# Patient Record
Sex: FEMALE | Race: WHITE | NOT HISPANIC OR LATINO | Employment: UNEMPLOYED | URBAN - METROPOLITAN AREA
[De-identification: names, ages, dates, MRNs, and addresses within clinical notes are randomized per-mention and may not be internally consistent; named-entity substitution may affect disease eponyms.]

---

## 2017-04-11 ENCOUNTER — ALLSCRIPTS OFFICE VISIT (OUTPATIENT)
Dept: OTHER | Facility: OTHER | Age: 27
End: 2017-04-11

## 2017-04-14 ENCOUNTER — ALLSCRIPTS OFFICE VISIT (OUTPATIENT)
Dept: OTHER | Facility: OTHER | Age: 27
End: 2017-04-14

## 2017-04-14 DIAGNOSIS — Z34.00 ENCOUNTER FOR SUPERVISION OF NORMAL FIRST PREGNANCY: ICD-10-CM

## 2017-04-21 ENCOUNTER — APPOINTMENT (OUTPATIENT)
Dept: LAB | Facility: CLINIC | Age: 27
End: 2017-04-21
Payer: COMMERCIAL

## 2017-04-21 ENCOUNTER — LAB REQUISITION (OUTPATIENT)
Dept: LAB | Facility: HOSPITAL | Age: 27
End: 2017-04-21
Payer: COMMERCIAL

## 2017-04-21 DIAGNOSIS — Z34.00 ENCOUNTER FOR SUPERVISION OF NORMAL FIRST PREGNANCY: ICD-10-CM

## 2017-04-21 LAB
BASOPHILS # BLD AUTO: 0.01 THOUSANDS/ΜL (ref 0–0.1)
BASOPHILS NFR BLD AUTO: 0 % (ref 0–1)
BILIRUB UR QL STRIP: NEGATIVE
CLARITY UR: CLEAR
COLOR UR: YELLOW
EOSINOPHIL # BLD AUTO: 0.14 THOUSAND/ΜL (ref 0–0.61)
EOSINOPHIL NFR BLD AUTO: 2 % (ref 0–6)
ERYTHROCYTE [DISTWIDTH] IN BLOOD BY AUTOMATED COUNT: 12.2 % (ref 11.6–15.1)
GLUCOSE UR STRIP-MCNC: NEGATIVE MG/DL
HCT VFR BLD AUTO: 37.9 % (ref 34.8–46.1)
HGB BLD-MCNC: 13 G/DL (ref 11.5–15.4)
HGB UR QL STRIP.AUTO: NEGATIVE
KETONES UR STRIP-MCNC: NEGATIVE MG/DL
LEUKOCYTE ESTERASE UR QL STRIP: NEGATIVE
LYMPHOCYTES # BLD AUTO: 1.75 THOUSANDS/ΜL (ref 0.6–4.47)
LYMPHOCYTES NFR BLD AUTO: 23 % (ref 14–44)
MCH RBC QN AUTO: 32.4 PG (ref 26.8–34.3)
MCHC RBC AUTO-ENTMCNC: 34.3 G/DL (ref 31.4–37.4)
MCV RBC AUTO: 95 FL (ref 82–98)
MONOCYTES # BLD AUTO: 0.51 THOUSAND/ΜL (ref 0.17–1.22)
MONOCYTES NFR BLD AUTO: 7 % (ref 4–12)
NEUTROPHILS # BLD AUTO: 5.29 THOUSANDS/ΜL (ref 1.85–7.62)
NEUTS SEG NFR BLD AUTO: 68 % (ref 43–75)
NITRITE UR QL STRIP: NEGATIVE
NRBC BLD AUTO-RTO: 0 /100 WBCS
PH UR STRIP.AUTO: 6 [PH] (ref 4.5–8)
PLATELET # BLD AUTO: 197 THOUSANDS/UL (ref 149–390)
PMV BLD AUTO: 10.6 FL (ref 8.9–12.7)
PROT UR STRIP-MCNC: NEGATIVE MG/DL
RBC # BLD AUTO: 4.01 MILLION/UL (ref 3.81–5.12)
RUBV IGG SERPL IA-ACNC: >175 IU/ML
SP GR UR STRIP.AUTO: 1.02 (ref 1–1.03)
UROBILINOGEN UR QL STRIP.AUTO: 0.2 E.U./DL
WBC # BLD AUTO: 7.72 THOUSAND/UL (ref 4.31–10.16)

## 2017-04-21 PROCEDURE — 36415 COLL VENOUS BLD VENIPUNCTURE: CPT

## 2017-04-21 PROCEDURE — 81003 URINALYSIS AUTO W/O SCOPE: CPT

## 2017-04-21 PROCEDURE — 86778 TOXOPLASMA ANTIBODY IGM: CPT

## 2017-04-21 PROCEDURE — 80081 OBSTETRIC PANEL INC HIV TSTG: CPT

## 2017-04-21 PROCEDURE — 87086 URINE CULTURE/COLONY COUNT: CPT

## 2017-04-21 PROCEDURE — 86777 TOXOPLASMA ANTIBODY: CPT

## 2017-04-22 LAB
ABO GROUP BLD: NORMAL
BLD GP AB SCN SERPL QL: NEGATIVE
CLINICAL COMMENT: NORMAL
HBV SURFACE AG SER QL: NORMAL
RH BLD: POSITIVE
SPECIMEN EXPIRATION DATE: NORMAL
T GONDII IGG SERPL IA-ACNC: <3 IU/ML (ref 0–7.1)
T GONDII IGM SER IA-ACNC: <3 AU/ML (ref 0–7.9)

## 2017-04-23 LAB
BACTERIA UR CULT: NORMAL
BACTERIA UR CULT: NORMAL
GRAM STN SPEC: NORMAL

## 2017-04-24 LAB
HIV 1+2 AB+HIV1 P24 AG SERPL QL IA: NORMAL
RPR SER QL: NORMAL

## 2017-05-02 ENCOUNTER — GENERIC CONVERSION - ENCOUNTER (OUTPATIENT)
Dept: OTHER | Facility: OTHER | Age: 27
End: 2017-05-02

## 2017-05-02 ENCOUNTER — LAB REQUISITION (OUTPATIENT)
Dept: LAB | Facility: HOSPITAL | Age: 27
End: 2017-05-02
Payer: COMMERCIAL

## 2017-05-02 DIAGNOSIS — Z34.00 ENCOUNTER FOR SUPERVISION OF NORMAL FIRST PREGNANCY: ICD-10-CM

## 2017-05-02 PROCEDURE — 88175 CYTOPATH C/V AUTO FLUID REDO: CPT | Performed by: OBSTETRICS & GYNECOLOGY

## 2017-05-02 PROCEDURE — 87591 N.GONORRHOEAE DNA AMP PROB: CPT | Performed by: OBSTETRICS & GYNECOLOGY

## 2017-05-02 PROCEDURE — 87491 CHLMYD TRACH DNA AMP PROBE: CPT | Performed by: OBSTETRICS & GYNECOLOGY

## 2017-05-02 PROCEDURE — 87624 HPV HI-RISK TYP POOLED RSLT: CPT | Performed by: OBSTETRICS & GYNECOLOGY

## 2017-05-05 ENCOUNTER — ALLSCRIPTS OFFICE VISIT (OUTPATIENT)
Dept: PERINATAL CARE | Facility: MEDICAL CENTER | Age: 27
End: 2017-05-05
Payer: COMMERCIAL

## 2017-05-05 ENCOUNTER — GENERIC CONVERSION - ENCOUNTER (OUTPATIENT)
Dept: OTHER | Facility: OTHER | Age: 27
End: 2017-05-05

## 2017-05-05 PROCEDURE — 76813 OB US NUCHAL MEAS 1 GEST: CPT | Performed by: OBSTETRICS & GYNECOLOGY

## 2017-05-08 LAB
CHLAMYDIA DNA CVX QL NAA+PROBE: NORMAL
N GONORRHOEA DNA GENITAL QL NAA+PROBE: NORMAL

## 2017-05-09 LAB — HPV RRNA GENITAL QL NAA+PROBE: NORMAL

## 2017-05-10 LAB
LAB AP GYN PRIMARY INTERPRETATION: NORMAL
LAB AP LMP: NORMAL
Lab: NORMAL

## 2017-05-11 ENCOUNTER — LAB CONVERSION - ENCOUNTER (OUTPATIENT)
Dept: OTHER | Facility: OTHER | Age: 27
End: 2017-05-11

## 2017-05-11 ENCOUNTER — GENERIC CONVERSION - ENCOUNTER (OUTPATIENT)
Dept: OTHER | Facility: OTHER | Age: 27
End: 2017-05-11

## 2017-05-11 LAB
AGE RISK DOWN SYNDROME (HISTORICAL): NORMAL
CALC'D GESTATIONAL AGE (HISTORICAL): 12.7
COLLECTION DATE (HISTORICAL): NORMAL
CROWN RUMP LENGTH (HISTORICAL): 66 MM
CROWN RUMP LENGTH (HISTORICAL): NORMAL MM
DATE OF BIRTH (HISTORICAL): NORMAL
DONOR AGE; EGG RETRIEVAL (HISTORICAL): NORMAL
DONOR EGG (HISTORICAL): NO
EDD DETERMINED BY (HISTORICAL): NORMAL
ESTIMATED DELIVERY DATE (EDD) (HISTORICAL): NORMAL
HCG MOM (HISTORICAL): 1.63
HCG QUANTITATIVE (HISTORICAL): 157.7 IU/ML
HX OF NEURAL TUBE DEFECTS (HISTORICAL): NO
IF TWINS (HISTORICAL): NORMAL
INSULIN DEP. DIABETIC (HISTORICAL): NO
INTERPRETATION (HISTORICAL): NORMAL
MATERNAL WEIGHT (HISTORICAL): 120 LBS
MSS DOWN SYNDROME RISK (HISTORICAL): NORMAL
MSS3 TRISOMY 18 RISK (HISTORICAL): NORMAL
NASAL BONE (HISTORICAL): NORMAL
NASAL BONE (HISTORICAL): PRESENT
NT MOM (HISTORICAL): 1.01
NTQR LOCATION ID (HISTORICAL): NORMAL
NTQR READING PHYS ID (HISTORICAL): NORMAL
NUCHAL TRANSLUCENCY (HISTORICAL): 1.5 MM
NUCHAL TRANSLUCENCY (HISTORICAL): NORMAL MM
NUMBER OF FETUSES (HISTORICAL): 1
PAPP-A (HISTORICAL): 2.26
PAPP-A (HISTORICAL): 2663.4 NG/ML
PREV PREGNANCY DOWN SYND (HISTORICAL): NO
RACE/ETHNIC ORIGIN (HISTORICAL): NORMAL
REFERRING PHYSICIAN (HISTORICAL): NORMAL
REFERRING PHYSICIAN NPI (HISTORICAL): NORMAL
REFERRING PHYSICIAN PHONE (HISTORICAL): NORMAL
REPEAT SPECIMEN (HISTORICAL): NO
ULTRASONOGRAPHER ID (HISTORICAL): NORMAL
ULTRASOUND DATE (HISTORICAL): NORMAL

## 2017-06-02 ENCOUNTER — GENERIC CONVERSION - ENCOUNTER (OUTPATIENT)
Dept: OTHER | Facility: OTHER | Age: 27
End: 2017-06-02

## 2017-06-07 ENCOUNTER — LAB CONVERSION - ENCOUNTER (OUTPATIENT)
Dept: OTHER | Facility: OTHER | Age: 27
End: 2017-06-07

## 2017-06-07 LAB
AFP (HISTORICAL): 1
AFP (HISTORICAL): 41.8 NG/ML
AGE RISK DOWN SYNDROME (HISTORICAL): NORMAL
CALC'D GESTATIONAL AGE (HISTORICAL): 16.7
COLLECTION DATE (HISTORICAL): NORMAL
CROWN RUMP LENGTH (HISTORICAL): 66 MM
DATE OF BIRTH (HISTORICAL): NORMAL
ESTIMATED DELIVERY DATE (EDD) (HISTORICAL): NORMAL
ESTRADIOL, FREE (HISTORICAL): 0.8 NG/ML
ESTRIOL MOM (HISTORICAL): 0.73
HCG MOM (HISTORICAL): 1.97
HCG QUANTITATIVE (HISTORICAL): 68.7 IU/ML
HX OF NEURAL TUBE DEFECTS (HISTORICAL): NO
INHIBIN A (HISTORICAL): 162 PG/ML
INHIBIN A MOM (HISTORICAL): 0.87
INSULIN DEP. DIABETIC (HISTORICAL): NO
INTERPRETATION (HISTORICAL): NORMAL
MATERNAL WEIGHT (HISTORICAL): 120 LBS
MSAFP RISK OPEN NTD (HISTORICAL): NORMAL
MSS DOWN SYNDROME RISK (HISTORICAL): NORMAL
MSS3 TRISOMY 18 RISK (HISTORICAL): NORMAL
NASAL BONE (HISTORICAL): NORMAL
NASAL BONE (HISTORICAL): PRESENT
NT MOM (HISTORICAL): 1.01
NUCHAL TRANSLUCENCY (HISTORICAL): 1.5 MM
NUMBER OF FETUSES (HISTORICAL): 1
PAPP-A (HISTORICAL): 2.26
PAPP-A (HISTORICAL): 2663.4 NG/ML
RACE/ETHNIC ORIGIN (HISTORICAL): NORMAL
REFERRING PHYSICIAN (HISTORICAL): NORMAL
REFERRING PHYSICIAN NPI (HISTORICAL): NORMAL
REFERRING PHYSICIAN PHONE (HISTORICAL): NORMAL
REPEAT SPECIMEN (HISTORICAL): NO
SPECIMEN: (HISTORICAL): NORMAL
ULTRASOUND DATE (HISTORICAL): NORMAL

## 2017-06-08 ENCOUNTER — GENERIC CONVERSION - ENCOUNTER (OUTPATIENT)
Dept: OTHER | Facility: OTHER | Age: 27
End: 2017-06-08

## 2017-06-30 ENCOUNTER — GENERIC CONVERSION - ENCOUNTER (OUTPATIENT)
Dept: OTHER | Facility: OTHER | Age: 27
End: 2017-06-30

## 2017-06-30 ENCOUNTER — ALLSCRIPTS OFFICE VISIT (OUTPATIENT)
Dept: PERINATAL CARE | Facility: MEDICAL CENTER | Age: 27
End: 2017-06-30
Payer: COMMERCIAL

## 2017-06-30 PROCEDURE — 76805 OB US >/= 14 WKS SNGL FETUS: CPT | Performed by: OBSTETRICS & GYNECOLOGY

## 2017-06-30 PROCEDURE — 76817 TRANSVAGINAL US OBSTETRIC: CPT | Performed by: OBSTETRICS & GYNECOLOGY

## 2017-07-28 ENCOUNTER — GENERIC CONVERSION - ENCOUNTER (OUTPATIENT)
Dept: OTHER | Facility: OTHER | Age: 27
End: 2017-07-28

## 2017-07-28 DIAGNOSIS — Z34.00 ENCOUNTER FOR SUPERVISION OF NORMAL FIRST PREGNANCY: ICD-10-CM

## 2017-07-28 DIAGNOSIS — R73.02 IMPAIRED GLUCOSE TOLERANCE: ICD-10-CM

## 2017-08-14 ENCOUNTER — LAB CONVERSION - ENCOUNTER (OUTPATIENT)
Dept: OTHER | Facility: OTHER | Age: 27
End: 2017-08-14

## 2017-08-14 LAB
DEPRECATED RDW RBC AUTO: 11.6 % (ref 11–15)
GLUCOSE 1 HR 50 GM GLUC CHALLENGE-PREG PTS (HISTORICAL): 139 MG/DL
HCT VFR BLD AUTO: 35.6 % (ref 35–45)
HGB BLD-MCNC: 11.9 G/DL (ref 11.7–15.5)
MCH RBC QN AUTO: 32.7 PG (ref 27–33)
MCHC RBC AUTO-ENTMCNC: 33.4 G/DL (ref 32–36)
MCV RBC AUTO: 97.8 FL (ref 80–100)
PLATELET # BLD AUTO: 192 THOUSAND/UL (ref 140–400)
PMV BLD AUTO: 10.2 FL (ref 7.5–12.5)
RBC # BLD AUTO: 3.64 MILLION/UL (ref 3.8–5.1)
RPR SCREEN (HISTORICAL): NORMAL
WBC # BLD AUTO: 9.2 THOUSAND/UL (ref 3.8–10.8)

## 2017-08-15 ENCOUNTER — GENERIC CONVERSION - ENCOUNTER (OUTPATIENT)
Dept: OTHER | Facility: OTHER | Age: 27
End: 2017-08-15

## 2017-08-19 ENCOUNTER — LAB CONVERSION - ENCOUNTER (OUTPATIENT)
Dept: OTHER | Facility: OTHER | Age: 27
End: 2017-08-19

## 2017-08-19 LAB
COMMENT (HISTORICAL): ABNORMAL
GLUCOSE 1 HOUR 100 GM GLUC CHALLENGE-PREG PTS (HISTORICAL): 207 MG/DL
GLUCOSE 3 HOUR 100 GM GLUC CHALLENGE-PREG PTS (HISTORICAL): 101 MG/DL
GLUCOSE FASTING (HISTORICAL): 80 MG/DL (ref 65–94)
GLUCOSE, 2 HR PP (HISTORICAL): 146 MG/DL

## 2017-08-23 ENCOUNTER — ALLSCRIPTS OFFICE VISIT (OUTPATIENT)
Dept: OTHER | Facility: OTHER | Age: 27
End: 2017-08-23

## 2017-08-23 ENCOUNTER — GENERIC CONVERSION - ENCOUNTER (OUTPATIENT)
Dept: OTHER | Facility: OTHER | Age: 27
End: 2017-08-23

## 2017-09-08 ENCOUNTER — GENERIC CONVERSION - ENCOUNTER (OUTPATIENT)
Dept: OTHER | Facility: OTHER | Age: 27
End: 2017-09-08

## 2017-09-22 ENCOUNTER — GENERIC CONVERSION - ENCOUNTER (OUTPATIENT)
Dept: OTHER | Facility: OTHER | Age: 27
End: 2017-09-22

## 2017-10-03 ENCOUNTER — GENERIC CONVERSION - ENCOUNTER (OUTPATIENT)
Dept: OTHER | Facility: OTHER | Age: 27
End: 2017-10-03

## 2017-10-24 ENCOUNTER — GENERIC CONVERSION - ENCOUNTER (OUTPATIENT)
Dept: OTHER | Facility: OTHER | Age: 27
End: 2017-10-24

## 2017-10-24 ENCOUNTER — LAB REQUISITION (OUTPATIENT)
Dept: LAB | Facility: HOSPITAL | Age: 27
End: 2017-10-24
Payer: COMMERCIAL

## 2017-10-24 DIAGNOSIS — Z34.00 ENCOUNTER FOR SUPERVISION OF NORMAL FIRST PREGNANCY: ICD-10-CM

## 2017-10-24 PROCEDURE — 87653 STREP B DNA AMP PROBE: CPT | Performed by: OBSTETRICS & GYNECOLOGY

## 2017-10-26 LAB — GP B STREP DNA SPEC QL NAA+PROBE: NORMAL

## 2017-11-01 ENCOUNTER — HOSPITAL ENCOUNTER (OUTPATIENT)
Facility: HOSPITAL | Age: 27
Discharge: HOME | End: 2017-11-01
Attending: OBSTETRICS & GYNECOLOGY | Admitting: OBSTETRICS & GYNECOLOGY
Payer: COMMERCIAL

## 2017-11-01 ENCOUNTER — GENERIC CONVERSION - ENCOUNTER (OUTPATIENT)
Dept: OTHER | Facility: OTHER | Age: 27
End: 2017-11-01

## 2017-11-01 VITALS
HEART RATE: 77 BPM | RESPIRATION RATE: 18 BRPM | TEMPERATURE: 98.2 F | DIASTOLIC BLOOD PRESSURE: 69 MMHG | SYSTOLIC BLOOD PRESSURE: 132 MMHG

## 2017-11-01 PROCEDURE — 99213 OFFICE O/P EST LOW 20 MIN: CPT

## 2017-11-01 PROCEDURE — G0463 HOSPITAL OUTPT CLINIC VISIT: HCPCS

## 2017-11-02 ENCOUNTER — HOSPITAL ENCOUNTER (INPATIENT)
Facility: HOSPITAL | Age: 27
LOS: 2 days | Discharge: HOME | End: 2017-11-04
Attending: OBSTETRICS & GYNECOLOGY | Admitting: OBSTETRICS & GYNECOLOGY
Payer: COMMERCIAL

## 2017-11-02 ENCOUNTER — ANESTHESIA (INPATIENT)
Dept: LABOR AND DELIVERY | Facility: HOSPITAL | Age: 27
End: 2017-11-02
Payer: COMMERCIAL

## 2017-11-02 ENCOUNTER — ANESTHESIA EVENT (INPATIENT)
Dept: LABOR AND DELIVERY | Facility: HOSPITAL | Age: 27
End: 2017-11-02
Payer: COMMERCIAL

## 2017-11-02 DIAGNOSIS — Z3A.38 38 WEEKS GESTATION OF PREGNANCY: Primary | ICD-10-CM

## 2017-11-02 DIAGNOSIS — E66.09 CLASS 1 OBESITY DUE TO EXCESS CALORIES IN ADULT: ICD-10-CM

## 2017-11-02 LAB
ABO GROUP BLD: NORMAL
BASE EXCESS BLDCOA CALC-SCNC: -2.2 MMOL/L (ref 3–11)
BASE EXCESS BLDCOV CALC-SCNC: -3.3 MMOL/L (ref 1–9)
BLD GP AB SCN SERPL QL: NEGATIVE
ERYTHROCYTE [DISTWIDTH] IN BLOOD BY AUTOMATED COUNT: 12.7 % (ref 11.6–15.1)
HCO3 BLDCOA-SCNC: 26.3 MMOL/L (ref 17.3–27.3)
HCO3 BLDCOV-SCNC: 20.9 MMOL/L (ref 12.2–28.6)
HCT VFR BLD AUTO: 37.5 % (ref 34.8–46.1)
HGB BLD-MCNC: 12.8 G/DL (ref 11.5–15.4)
MCH RBC QN AUTO: 31.3 PG (ref 26.8–34.3)
MCHC RBC AUTO-ENTMCNC: 34.1 G/DL (ref 31.4–37.4)
MCV RBC AUTO: 92 FL (ref 82–98)
O2 CT VFR BLDCOA CALC: 8.2 ML/DL
OXYHGB MFR BLDCOA: 32 %
OXYHGB MFR BLDCOV: 70.3 %
PCO2 BLDCOA: 58.7 MM[HG] (ref 30–60)
PCO2 BLDCOV: 35.7 MM HG (ref 27–43)
PH BLDCOA: 7.27 [PH] (ref 7.23–7.43)
PH BLDCOV: 7.38 [PH] (ref 7.19–7.49)
PLATELET # BLD AUTO: 182 THOUSANDS/UL (ref 149–390)
PMV BLD AUTO: 10.2 FL (ref 8.9–12.7)
PO2 BLDCOA: 18.2 MM HG (ref 5–25)
PO2 BLDCOV: 30.3 MM HG (ref 15–45)
RBC # BLD AUTO: 4.09 MILLION/UL (ref 3.81–5.12)
RH BLD: POSITIVE
RPR SER QL: NORMAL
SAO2 % BLDCOV: 18.8 ML/DL
SPECIMEN EXPIRATION DATE: NORMAL
WBC # BLD AUTO: 13.81 THOUSAND/UL (ref 4.31–10.16)

## 2017-11-02 PROCEDURE — 86850 RBC ANTIBODY SCREEN: CPT | Performed by: OBSTETRICS & GYNECOLOGY

## 2017-11-02 PROCEDURE — 85027 COMPLETE CBC AUTOMATED: CPT | Performed by: OBSTETRICS & GYNECOLOGY

## 2017-11-02 PROCEDURE — 86901 BLOOD TYPING SEROLOGIC RH(D): CPT | Performed by: OBSTETRICS & GYNECOLOGY

## 2017-11-02 PROCEDURE — 86592 SYPHILIS TEST NON-TREP QUAL: CPT | Performed by: OBSTETRICS & GYNECOLOGY

## 2017-11-02 PROCEDURE — 82805 BLOOD GASES W/O2 SATURATION: CPT | Performed by: OBSTETRICS & GYNECOLOGY

## 2017-11-02 PROCEDURE — 86900 BLOOD TYPING SEROLOGIC ABO: CPT | Performed by: OBSTETRICS & GYNECOLOGY

## 2017-11-02 PROCEDURE — 99214 OFFICE O/P EST MOD 30 MIN: CPT

## 2017-11-02 PROCEDURE — 0KQM0ZZ REPAIR PERINEUM MUSCLE, OPEN APPROACH: ICD-10-PCS | Performed by: OBSTETRICS & GYNECOLOGY

## 2017-11-02 PROCEDURE — 88307 TISSUE EXAM BY PATHOLOGIST: CPT | Performed by: OBSTETRICS & GYNECOLOGY

## 2017-11-02 RX ORDER — ONDANSETRON 2 MG/ML
4 INJECTION INTRAMUSCULAR; INTRAVENOUS EVERY 6 HOURS PRN
Status: DISCONTINUED | OUTPATIENT
Start: 2017-11-02 | End: 2017-11-02

## 2017-11-02 RX ORDER — LIDOCAINE HYDROCHLORIDE 10 MG/ML
INJECTION, SOLUTION EPIDURAL; INFILTRATION; INTRACAUDAL; PERINEURAL
Status: COMPLETED
Start: 2017-11-02 | End: 2017-11-02

## 2017-11-02 RX ORDER — DIPHENHYDRAMINE HCL 25 MG
25 TABLET ORAL EVERY 6 HOURS PRN
Status: DISCONTINUED | OUTPATIENT
Start: 2017-11-02 | End: 2017-11-05 | Stop reason: HOSPADM

## 2017-11-02 RX ORDER — OXYTOCIN/RINGER'S LACTATE 30/500 ML
PLASTIC BAG, INJECTION (ML) INTRAVENOUS
Status: COMPLETED
Start: 2017-11-02 | End: 2017-11-02

## 2017-11-02 RX ORDER — ONDANSETRON 2 MG/ML
4 INJECTION INTRAMUSCULAR; INTRAVENOUS EVERY 8 HOURS PRN
Status: DISCONTINUED | OUTPATIENT
Start: 2017-11-02 | End: 2017-11-05 | Stop reason: HOSPADM

## 2017-11-02 RX ORDER — ROPIVACAINE HYDROCHLORIDE 2 MG/ML
INJECTION, SOLUTION EPIDURAL; INFILTRATION; PERINEURAL AS NEEDED
Status: DISCONTINUED | OUTPATIENT
Start: 2017-11-02 | End: 2017-11-02 | Stop reason: SURG

## 2017-11-02 RX ORDER — DIAPER,BRIEF,INFANT-TODD,DISP
1 EACH MISCELLANEOUS DAILY PRN
Status: DISCONTINUED | OUTPATIENT
Start: 2017-11-02 | End: 2017-11-05 | Stop reason: HOSPADM

## 2017-11-02 RX ORDER — ACETAMINOPHEN 325 MG/1
650 TABLET ORAL EVERY 6 HOURS PRN
Status: DISCONTINUED | OUTPATIENT
Start: 2017-11-02 | End: 2017-11-05 | Stop reason: HOSPADM

## 2017-11-02 RX ORDER — OXYCODONE HYDROCHLORIDE AND ACETAMINOPHEN 5; 325 MG/1; MG/1
2 TABLET ORAL EVERY 4 HOURS PRN
Status: DISCONTINUED | OUTPATIENT
Start: 2017-11-02 | End: 2017-11-05 | Stop reason: HOSPADM

## 2017-11-02 RX ORDER — OXYCODONE HYDROCHLORIDE AND ACETAMINOPHEN 5; 325 MG/1; MG/1
1 TABLET ORAL EVERY 4 HOURS PRN
Status: DISCONTINUED | OUTPATIENT
Start: 2017-11-02 | End: 2017-11-05 | Stop reason: HOSPADM

## 2017-11-02 RX ORDER — DOCUSATE SODIUM 100 MG/1
100 CAPSULE, LIQUID FILLED ORAL 2 TIMES DAILY
Status: DISCONTINUED | OUTPATIENT
Start: 2017-11-02 | End: 2017-11-05 | Stop reason: HOSPADM

## 2017-11-02 RX ORDER — SODIUM CHLORIDE, SODIUM LACTATE, POTASSIUM CHLORIDE, CALCIUM CHLORIDE 600; 310; 30; 20 MG/100ML; MG/100ML; MG/100ML; MG/100ML
125 INJECTION, SOLUTION INTRAVENOUS CONTINUOUS
Status: DISCONTINUED | OUTPATIENT
Start: 2017-11-02 | End: 2017-11-02

## 2017-11-02 RX ORDER — ACETAMINOPHEN 325 MG/1
650 TABLET ORAL EVERY 6 HOURS PRN
Status: DISCONTINUED | OUTPATIENT
Start: 2017-11-02 | End: 2017-11-02

## 2017-11-02 RX ORDER — CALCIUM CARBONATE 200(500)MG
1000 TABLET,CHEWABLE ORAL DAILY PRN
Status: DISCONTINUED | OUTPATIENT
Start: 2017-11-02 | End: 2017-11-05 | Stop reason: HOSPADM

## 2017-11-02 RX ORDER — IBUPROFEN 600 MG/1
600 TABLET ORAL EVERY 6 HOURS PRN
Status: DISCONTINUED | OUTPATIENT
Start: 2017-11-02 | End: 2017-11-05 | Stop reason: HOSPADM

## 2017-11-02 RX ADMIN — ROPIVACAINE HYDROCHLORIDE 10 ML: 2 INJECTION, SOLUTION EPIDURAL; INFILTRATION at 06:18

## 2017-11-02 RX ADMIN — AMPICILLIN SODIUM 2000 MG: 2 INJECTION, POWDER, FOR SOLUTION INTRAMUSCULAR; INTRAVENOUS at 19:02

## 2017-11-02 RX ADMIN — BENZOCAINE AND MENTHOL: 20; .5 SPRAY TOPICAL at 22:49

## 2017-11-02 RX ADMIN — ROPIVACAINE HYDROCHLORIDE: 2 INJECTION, SOLUTION EPIDURAL; INFILTRATION at 06:30

## 2017-11-02 RX ADMIN — WITCH HAZEL 1 PAD: 500 SOLUTION RECTAL; TOPICAL at 22:49

## 2017-11-02 RX ADMIN — SODIUM CHLORIDE, SODIUM LACTATE, POTASSIUM CHLORIDE, AND CALCIUM CHLORIDE 999 ML/HR: .6; .31; .03; .02 INJECTION, SOLUTION INTRAVENOUS at 05:15

## 2017-11-02 RX ADMIN — LIDOCAINE HYDROCHLORIDE 300 MG: 10 INJECTION, SOLUTION EPIDURAL; INFILTRATION; INTRACAUDAL; PERINEURAL at 18:41

## 2017-11-02 RX ADMIN — ACETAMINOPHEN 650 MG: 325 TABLET, FILM COATED ORAL at 18:11

## 2017-11-02 RX ADMIN — ONDANSETRON 4 MG: 2 INJECTION INTRAMUSCULAR; INTRAVENOUS at 15:07

## 2017-11-02 RX ADMIN — ROPIVACAINE HYDROCHLORIDE: 2 INJECTION, SOLUTION EPIDURAL; INFILTRATION at 17:57

## 2017-11-02 RX ADMIN — SODIUM CHLORIDE, SODIUM LACTATE, POTASSIUM CHLORIDE, AND CALCIUM CHLORIDE 999 ML/HR: .6; .31; .03; .02 INJECTION, SOLUTION INTRAVENOUS at 05:58

## 2017-11-02 RX ADMIN — GENTAMICIN SULFATE 260 MG: 40 INJECTION, SOLUTION INTRAMUSCULAR; INTRAVENOUS at 19:46

## 2017-11-02 RX ADMIN — SODIUM CHLORIDE, SODIUM LACTATE, POTASSIUM CHLORIDE, AND CALCIUM CHLORIDE 125 ML/HR: .6; .31; .03; .02 INJECTION, SOLUTION INTRAVENOUS at 10:03

## 2017-11-02 RX ADMIN — Medication 250 MILLI-UNITS/MIN: at 18:43

## 2017-11-02 RX ADMIN — SODIUM CHLORIDE, SODIUM LACTATE, POTASSIUM CHLORIDE, AND CALCIUM CHLORIDE 125 ML/HR: .6; .31; .03; .02 INJECTION, SOLUTION INTRAVENOUS at 17:45

## 2017-11-02 RX ADMIN — ONDANSETRON 4 MG: 2 INJECTION INTRAMUSCULAR; INTRAVENOUS at 06:44

## 2017-11-02 NOTE — OB LABOR/OXYTOCIN SAFETY PROGRESS
Labor Progress Note - Rich Crane 32 y o  female MRN: [id number]    Unit/Bed#: -01 Encounter: [id number]    Obstetric History       T0      L0     SAB0   TAB0   Ectopic0   Multiple0   Live Births0      Gestational Age: 36w4d     Contraction Frequency (minutes): 2-4  Contraction Quality: (P) Moderate  Tachysystole: No   Dilation: 7        Effacement (%): 100  Station: -1  Baseline Rate: 140 bpm  Fetal Heart Rate: (P) 140 BPM  FHR Category: Category I          Notes/comments:   Pt feeling comfortable with epidural  SVE initially 6/100/-1, AROM's for clear fluid @0831 and SVE as above   Continue expt  management          Zuleima Toney MD 2017 8:40 AM

## 2017-11-02 NOTE — ANESTHESIA PREPROCEDURE EVALUATION
Review of Systems/Medical History  Patient summary reviewed  Chart reviewed      Cardiovascular  Exercise tolerance: good,     Pulmonary  Negative pulmonary ROS ,        GI/Hepatic  Negative GI/hepatic ROS          Negative  ROS        Endo/Other  Negative endo/other ROS      GYN  Negative gynecology ROS          Hematology  Negative hematology ROS      Musculoskeletal  Obesity ,        Neurology  Negative neurology ROS      Psychology   Negative psychology ROS            Physical Exam    Airway    Mallampati score: II  TM Distance: >3 FB  Neck ROM: full     Dental   No notable dental hx     Cardiovascular  Cardiovascular exam normal    Pulmonary  Pulmonary exam normal     Other Findings        Anesthesia Plan  ASA Score- 2       Anesthesia Type- epidural with ASA Monitors  Additional Monitors:   Airway Plan:           Induction-       Informed Consent- Anesthetic plan and risks discussed with patient

## 2017-11-02 NOTE — ANESTHESIA PROCEDURE NOTES
Epidural Block    Patient location during procedure: OB  Start time: 11/2/2017 6:05 AM  Reason for block: procedure for pain and at surgeon's request  Staffing  Anesthesiologist: Isidoro Gomez  Performed: anesthesiologist   Preanesthetic Checklist  Completed: patient identified, site marked, surgical consent, pre-op evaluation, timeout performed, IV checked, risks and benefits discussed and monitors and equipment checked  Epidural  Patient position: sitting  Prep: Betadine  Patient monitoring: heart rate, continuous pulse ox and frequent blood pressure checks  Approach: midline  Location: lumbar (1-5)  Injection technique: XIN air  Needle  Needle type: Tuohy   Needle gauge: 18 G  Catheter type: side hole  Catheter size: 20 G  Catheter at skin depth: 12 cm  Test dose: negative and lidocaine 1 5% with epinephrine 1-to-200,000  Assessment  Sensory level: [id number] aspiration for CSF and negative aspiration for heme  patient tolerated the procedure well with no immediate complications

## 2017-11-02 NOTE — OB LABOR/OXYTOCIN SAFETY PROGRESS
Labor Progress Note - Riaz Jurado 32 y o  female MRN: [id number]    Unit/Bed#: -01 Encounter: [id number]    Obstetric History       T0      L0     SAB0   TAB0   Ectopic0   Multiple0   Live Births0      Gestational Age: 36w4d     Contraction Frequency (minutes): 2-3  Contraction Quality: Moderate  Tachysystole: No   Dilation: 10  Dilation Complete Date: 17  Dilation Complete Time: 1118  Effacement (%): 100  Station: 3  Baseline Rate: 150 bpm  Fetal Heart Rate: 164 BPM  FHR Category: Category I          Notes/comments:   Pushing X 2 hrs 20 min   more effective since epidural dosed  Had been offered vacuum assistance, patient initially considered  Would like to continue pushing            Marin Kinney DO 2017 5:15 PM

## 2017-11-02 NOTE — H&P
H&P Exam - Obstetrics   Sheridan Preciado 32 y o  female MRN: [id number]  Unit/Bed#: LD PACU-03 Encounter: [id number]    Patient is under the care of BENJAMIN    History of Present Illness     Chief Complaint: Active labor    HPI:  Sheridan Preciado is a 32 y o   female with an OLIVER of 2017, by Last Menstrual Period at 38w2d weeks gestation who is being admitted for Active labor  She has had increasingly strong contractions since 16:00 yesterday  They are approximately every 4min apart  Her current obstetrical history is significant for maternal obesity BMI 30, elevated 1 hr glucola, normal 3hr GTT, otherwise unremarkable preganancy course  Contractions: present see above  Leakage of fluid: None  Bleeding: None  Fetal movement: present  PREGNANCY COMPLICATIONS:   Elevated 1 hr, with normal 3hr   Maternal obesity    OB History    Para Term  AB Living   1 0           SAB TAB Ectopic Multiple Live Births                  # Outcome Date GA Lbr Zackery/2nd Weight Sex Delivery Anes PTL Lv   1 Current                   Baby complications/comments: none    Review of Systems   Constitutional: Negative for chills and fever  Cardiovascular: Negative for chest pain and leg swelling  Gastrointestinal: Negative for abdominal pain, constipation and vomiting  Historical Information   Past Medical History:   Diagnosis Date    Obese     Varicella     vaccine     History reviewed  No pertinent surgical history  Social History   History   Alcohol Use No     History   Drug Use No     History   Smoking Status    Former Smoker   Smokeless Tobacco    Never Used     Family History: non-contributory    Meds/Allergies      Prescriptions Prior to Admission   Medication    Prenatal Vit-Fe Fumarate-FA (PRENATAL VITAMIN PO)      No Known Allergies    OBJECTIVE:    Vitals: Blood pressure 126/75, pulse 84, temperature (!) 37 4 °F (3 °C), temperature source Tympanic, resp   rate 20, height 5' 3" (1 6 m), weight 78 kg (172 lb), last menstrual period 02/07/2017, SpO2 99 %, not currently breastfeeding  Body mass index is 30 47 kg/m²  Physical Exam   Constitutional: She is oriented to person, place, and time  She appears well-developed and well-nourished  HENT:   Head: Normocephalic and atraumatic  Eyes: Pupils are equal, round, and reactive to light  Neck: Normal range of motion  Cardiovascular: Normal rate, regular rhythm and normal heart sounds  Exam reveals no gallop and no friction rub  No murmur heard  Pulmonary/Chest: Effort normal and breath sounds normal  She has no wheezes  She has no rales  She exhibits no tenderness  Abdominal: Soft  Bowel sounds are normal  There is no tenderness  There is no rebound and no guarding  gravid   Genitourinary: Vagina normal and uterus normal    Musculoskeletal: Normal range of motion  Neurological: She is alert and oriented to person, place, and time  Skin: Skin is warm and dry  No rash noted  Psychiatric: She has a normal mood and affect   Her behavior is normal  Judgment and thought content normal          Cervix: 5/100/-1, anterior, soft with bulging bag       Fetal heart rate: 130 bpm baseline, moderate variability, no decelerations, reactive accelerations       [place]: q2-4min       EFW: unknown    GBS: negative    Prenatal Labs:   Blood Type:   Lab Results   Component Value Date/Time    ABO Grouping A 04/21/2017 10:33 PM     , D (Rh type):   Lab Results   Component Value Date/Time    Rh Factor Positive 04/21/2017 10:33 PM     , Antibody Screen:   Lab Results   Component Value Date/Time    Antibody Screen Negative 04/21/2017 10:33 PM    , HCT/HGB:   Lab Results   Component Value Date/Time    Hematocrit 37 9 04/21/2017 11:51 AM    Hemoglobin 13 0 04/21/2017 11:51 AM      , MCV:   Lab Results   Component Value Date/Time    MCV 95 04/21/2017 11:51 AM      , Platelets:   Lab Results   Component Value Date/Time    Platelets 530 75/18/4369 11:51 AM      , 1 hour Glucola:   Lab Results   Component Value Date/Time    Glucose 139 2017   , 3 hour GTT:   Lab Results   Component Value Date/Time    Glucose, GTT - 3 Hour 101 2017   , Varicella: No results found for: VARICELLAIGG    , Rubella:   Lab Results   Component Value Date/Time    Rubella IgG Quant >175 0 2017 11:51 AM        , VDRL/RPR:   Lab Results   Component Value Date/Time    RPR Non-Reactive 2017 11:51 AM      , Urine Culture/Screen:   Lab Results   Component Value Date/Time    Urine Culture 60,000-69,000 cfu/ml Lactobacillus species 2017 11:51 AM    Urine Culture <10,000 cfu/ml Mixed Contaminants X3 2017 11:51 AM       , Urine Drug Screen: No results found for: AMPHETUR, BARBTUR, BDZUR, THCUR, COCAINEUR, METHADONEUR, OPIATEUR, PCPUR, MTHAMUR, ECSTASYUR, TRICYCLICSUR, Hep B:   Lab Results   Component Value Date/Time    Hepatitis B Surface Ag Non-reactive 2017 11:51 AM     , Hep C: No components found for: HEPCSAG, EXTHEPCSAG   , HIV:   Lab Results   Component Value Date/Time    HIV-1/HIV-2 Ab Non-Reactive 2017 11:51 AM     , Chlamydia: No results found for: EXTCHLAMYDIA  , Gonorrhea:   Lab Results   Component Value Date/Time    N gonorrhoeae, DNA Probe N  gonorrhoeae Amplified DNA Negative 2017     , Group B Strep:    Lab Results   Component Value Date/Time    Strep Grp B PCR Negative for Beta Hemolytic Strep Grp B by PCR 10/24/2017 04:56 PM          Invasive Devices          No matching active lines, drains, or airways            Assessment/Plan     ASSESSMENT:   Sammy Calloway is a 32 y o  yo   at 38w2d weeks gestation in active labor      PLAN:   1) Admit to L&D for labor    - IV LR for hydration   2) CBC, RPR, Blood Type    - Antepartum Hb pending   3) Analgesia and/or epidural at patient request   4) Anticipate    5) D/w Dr Iliana Loving      This patient will be an INPATIENT  and I certify the anticipated length of stay is >2 Midnights      Jesus Chapman DO   OB/GYN PGY-1  11/2/2017 5:13 AM

## 2017-11-02 NOTE — OB LABOR/OXYTOCIN SAFETY PROGRESS
Labor Progress Note - Dominick Soliman 32 y o  female MRN: [id number]    Unit/Bed#: -01 Encounter: [id number]    Obstetric History       T0      L0     SAB0   TAB0   Ectopic0   Multiple0   Live Births0      Gestational Age: 36w4d     Contraction Frequency (minutes): 2-4  Contraction Quality: Strong  Tachysystole: No   Dilation: 10  Dilation Complete Date: 17  Dilation Complete Time: 1118  Effacement (%): 100  Station: 0  Baseline Rate: 135 bpm  Fetal Heart Rate: 138 BPM  FHR Category: Category I          Notes/comments:   Pt feeling comfortable with epidural  No urge to push   Will labor down and reassess in 1 hr or PRN    D/w Dr Jose Eden MD 2017 11:22 AM

## 2017-11-02 NOTE — DISCHARGE SUMMARY
Discharge Summary - Awa Sam 32 y o  female MRN: [id number]    Unit/Bed#: -01 Encounter: [id number]    Admission Date: 2017     Discharge Date: 2017      Admitting Diagnosis:   IUP @38w2d  Active labor    Discharge Diagnosis:   Same, delivered    Procedures:   spontaneous vaginal delivery    Admitting Attending: Dr Shavon Cleveland  Delivery Attending: Fany Mullen MD  Discharge Attending: Amaya Ga DO    Hospital Course:     Awa Sam is a 32 y o  [place] who was admitted at 38w2d for active labor  She made continuous cervical change, received an epidural for pain control and was AROM'd for clear moderate fluid at 0831  She proceeded to make cervical change and became complete at 1118  She started pushing at 1130  She had repeat intrapartum fevers and was treated empirically post-partum for chorioamnionitis with ampicillin and gentamicin per protocol  She then underwent an uncomplicated spontaneous vaginal delivery and delivered a viable male  at 65  APGARS were 8, 9  at 1 and 5 minutes, respectively   weighed 7lb 1oz  Placenta was delivered at [address] [facility]   was then transferred to  nursery  Patient tolerated the procedure well and was transferred to recovery in stable condition  On day of discharge, she was ambulating and able to reasonably perform all ADLs  She was voiding and had appropriate bowel function  Pain was well controlled  She was afebrile for greater than 24h prior to discharge  She was discharged home on postpartum day #2 without complications  Patient was instructed to follow up with her OB as an outpatient and was given appropriate warnings to call provider if she develops signs of infection or uncontrolled pain  Complications:   None    Condition at discharge:   good     Discharge instructions/Information to patient and family:   See after visit summary for information provided to patient and family        Provisions for Follow-Up Care:  See after visit summary for information related to follow-up care and any pertinent home health orders  Disposition:   Home    Planned Readmission:   No    Discharge Medications: For a complete list of the patient's medications, please refer to her med rec      Steph Nascimento DO  PGY-1 OB/GYN   11/5/2017 5:26 PM

## 2017-11-02 NOTE — PLAN OF CARE
BIRTH - VAGINAL/ SECTION     Fetal and maternal status remain reassuring during the birth process [de-identified]     Emotionally satisfying birthing experience for mother/fetus [address] Discharge to home or other facility with appropriate resources Progressing        INFECTION - ADULT     Absence or prevention of progression during hospitalization Progressing     Absence of fever/infection during neutropenic period Progressing        Knowledge Deficit     Patient/family/caregiver demonstrates understanding of disease process, treatment plan, medications, and discharge instructions Progressing        PAIN - ADULT     Verbalizes/displays adequate comfort level or baseline comfort level Progressing        SAFETY ADULT     Patient will remain free of falls Progressing     Maintain or return to baseline ADL function Progressing     Maintain or return mobility status to optimal level Progressing

## 2017-11-03 RX ADMIN — AMPICILLIN SODIUM 2000 MG: 2 INJECTION, POWDER, FOR SOLUTION INTRAMUSCULAR; INTRAVENOUS at 01:01

## 2017-11-03 RX ADMIN — DOCUSATE SODIUM 100 MG: 100 CAPSULE, LIQUID FILLED ORAL at 09:29

## 2017-11-03 RX ADMIN — IBUPROFEN 600 MG: 600 TABLET ORAL at 09:29

## 2017-11-03 RX ADMIN — ACETAMINOPHEN 650 MG: 325 TABLET, FILM COATED ORAL at 00:16

## 2017-11-03 RX ADMIN — IBUPROFEN 600 MG: 600 TABLET ORAL at 00:17

## 2017-11-03 RX ADMIN — DOCUSATE SODIUM 100 MG: 100 CAPSULE, LIQUID FILLED ORAL at 17:50

## 2017-11-03 NOTE — ANESTHESIA POSTPROCEDURE EVALUATION
Post-Op Assessment Note      CV Status:  Stable    Mental Status:  Alert    Hydration Status:  Stable    PONV Controlled:  None    Airway Patency:  Patent    Post Op Vitals Reviewed: Yes          Staff: Anesthesiologist     Post-op block assessment: catheter intact        /63 (11/02/17 2032)    Temp      Pulse 83 (11/02/17 2032)   Resp      SpO2

## 2017-11-03 NOTE — SOCIAL WORK
Breast pump consult  Per pt request, Medela pump ordered from [facility] include [facility] beds at the [facility] via [facility] for d/c tomorrow  No other needs noted

## 2017-11-03 NOTE — PROGRESS NOTES
Progress Note - OB/GYN   Cristin Yeager 32 y o  female MRN: [id number]  Unit/Bed#: -01 Encounter: [id number]    Assessment:  Post partum Day #1 s/p , stable    Plan:  Continue routine post partum care  Encourage ambulation  Encourage breastfeeding  Chorioamnionitis on Amp x6h for 2 Doses/Gent x 24h for 2 doses  Afebrile since delivery  Subjective/Objective   Chief Complaint:     Post delivery    Subjective:     Pain: yes, cramping, improved with meds  Tolerating PO: yes  Voiding: yes  Flatus: yes  BM: no  Ambulating: yes  Breastfeeding:  Yes  Chest pain: no  Shortness of breath: no  Leg pain: no  Lochia: minimal    Objective:     Vitals: /56   Pulse 69   Temp 98 2 °F (36 8 °C) (Oral)   Resp 16   Ht 5' 3" (1 6 m)   Wt 78 kg (172 lb)   LMP 2017   SpO2 99%   Breastfeeding?  Yes   BMI 30 47 kg/m²       Intake/Output Summary (Last 24 hours) at 17 0646  Last data filed at 17 2300   Gross per 24 hour   Intake                0 ml   Output             1400 ml   Net            -1400 ml       Lab Results   Component Value Date    WBC 13 81 (H) 2017    HGB 12 8 2017    HCT 37 5 2017    MCV 92 2017     2017       Current Facility-Administered Medications   Medication Dose Route Frequency    acetaminophen (TYLENOL) tablet 650 mg  650 mg Oral Q6H PRN    benzocaine-menthol-lanolin-aloe (DERMOPLAST) 20-0 5 % topical spray   Topical 4x Daily PRN    calcium carbonate (TUMS) chewable tablet 1,000 mg  1,000 mg Oral Daily PRN    diphenhydrAMINE (BENADRYL) tablet 25 mg  25 mg Oral Q6H PRN    docusate sodium (COLACE) capsule 100 mg  100 mg Oral BID    hydrocortisone 1 % cream 1 application  1 application Topical Daily PRN    ibuprofen (MOTRIN) tablet 600 mg  600 mg Oral Q6H PRN    ondansetron (ZOFRAN) injection 4 mg  4 mg Intravenous Q8H PRN    oxyCODONE-acetaminophen (PERCOCET) 5-325 mg per tablet 1 tablet  1 tablet Oral Q4H PRN    oxyCODONE-acetaminophen (PERCOCET) 5-325 mg per tablet 2 tablet  2 tablet Oral Q4H PRN    witch hazel-glycerin (TUCKS) topical pad 1 pad  1 pad Topical PRN       Physical Exam:     Gen: AAOx3, NAD  CV: RRR  Lungs: CTA b/l  Abd: Soft, non-tender, non-distended, no rebound or guarding  Uterine fundus firm and non-tender, 1 cm below umbilicus  Ext: Non tender    Josh Garcia, PGY-1  OB/GYN  11/3/2017  6:46 AM

## 2017-11-03 NOTE — L&D DELIVERY NOTE
DELIVERY NOTE  Seema Mckenna 32 y o  female MRN: [id number]  Unit/Bed#: -01 Encounter: [id number]    Obstetrician:   Ciaran Montejo MD    Assistant: Ollie Merlin Mondheim, DO (PGY-1)    Pre-Delivery Diagnosis: Term pregnancy  Single fetus  Chorioamnionitis    Post-Delivery Diagnosis: Same as above - Delivered  Viable male fetus    Procedure: Spontaneous vaginal delivery    Estimated Blood Loss:  263            Complications: Chorioamnionitis    Description of Delivery: Patient delivered a viable Male  over intact perineum and/or none and 2nd degree laceration at 18:25   BW 3204g  Apgars 8/9  A nuchal cord was noted and was too tight to reduce so the decision was made to deliver through  With the assistance of maternal expulsive efforts and downward traction of fetal head, the anterior shoulder was delivered without difficulty, followed by the remainder of the infant's body  After delivery of the  and a 30 second delay, the umbilical cord was doubly clamped and cut and the  was passed off to  staff for routine care  Umbilical cord blood and umbilical artery and venous gases were collected  Active management of the third stage of labor was undertaken with IV pitocin  Placenta was delivered with fundal massage and gentle traction on the cord with active management of the third stage of labor  Placenta delivered intact at 18:40 with a 3-vessel cord  Bleeding was noted to be under control  Inspection of the perineum, vagina, labia, and urethra revealed none and 2nd degree which was then repaired in standard fashion with 3-0 Vicryl rapide  The lacerations showed good tissue reapproximation and hemostasis  The patient was noted to be febrile while pushing  She was given an IV fluid bolus and tylenol  Ampicillin and gentamicin were ordered but did not arrive in the room until after her infant was delivered    She will receive one 24 hour dose of gentamicin and 2 doses of ampicillin 6 hours apart       Mother and baby are currently recovering nicely in stable condition

## 2017-11-04 VITALS
BODY MASS INDEX: 30.48 KG/M2 | WEIGHT: 172 LBS | TEMPERATURE: 98.5 F | HEIGHT: 63 IN | DIASTOLIC BLOOD PRESSURE: 77 MMHG | OXYGEN SATURATION: 99 % | HEART RATE: 76 BPM | RESPIRATION RATE: 16 BRPM | SYSTOLIC BLOOD PRESSURE: 119 MMHG

## 2017-11-04 RX ADMIN — DOCUSATE SODIUM 100 MG: 100 CAPSULE, LIQUID FILLED ORAL at 18:18

## 2017-11-04 RX ADMIN — DOCUSATE SODIUM 100 MG: 100 CAPSULE, LIQUID FILLED ORAL at 09:05

## 2017-11-04 RX ADMIN — IBUPROFEN 600 MG: 600 TABLET ORAL at 22:11

## 2017-11-04 RX ADMIN — IBUPROFEN 600 MG: 600 TABLET ORAL at 09:05

## 2017-11-04 NOTE — PROGRESS NOTES
Progress Note - OB/GYN   Carly Salmeron 32 y o  female MRN: [id number]  Unit/Bed#: -01 Encounter: [id number]    Assessment:  Post partum Day #2  S/p , stable    Plan:  Continue routine post partum care  Encourage ambulation  Encourage breastfeeding  Chorioamnionitis on Amp x6h for 2 doses/gent x 24 h for 2 doses (11/3)  Patient has been afebrile since  Anticipate discharge today    Subjective/Objective   Chief Complaint:     Post delivery    Subjective:     Pain: yes, cramping, improved with meds  Tolerating PO: yes  Voiding: yes  Flatus: yes  BM: no  Ambulating: yes  Breastfeeding:  Yes  Chest pain: no  Shortness of breath: no  Leg pain: no  Lochia: minimal    Objective:     Vitals: /72   Pulse 85   Temp 98 4 °F (36 9 °C) (Oral)   Resp 18   Ht 5' 3" (1 6 m)   Wt 78 kg (172 lb)   LMP 2017   SpO2 99%   Breastfeeding?  Yes   BMI 30 47 kg/m²     No intake or output data in the 24 hours ending 17 0810    Lab Results   Component Value Date    WBC 13 81 (H) 2017    HGB 12 8 2017    HCT 37 5 2017    MCV 92 2017     2017       Current Facility-Administered Medications   Medication Dose Route Frequency    acetaminophen (TYLENOL) tablet 650 mg  650 mg Oral Q6H PRN    benzocaine-menthol-lanolin-aloe (DERMOPLAST) 20-0 5 % topical spray   Topical 4x Daily PRN    calcium carbonate (TUMS) chewable tablet 1,000 mg  1,000 mg Oral Daily PRN    diphenhydrAMINE (BENADRYL) tablet 25 mg  25 mg Oral Q6H PRN    docusate sodium (COLACE) capsule 100 mg  100 mg Oral BID    hydrocortisone 1 % cream 1 application  1 application Topical Daily PRN    ibuprofen (MOTRIN) tablet 600 mg  600 mg Oral Q6H PRN    ondansetron (ZOFRAN) injection 4 mg  4 mg Intravenous Q8H PRN    oxyCODONE-acetaminophen (PERCOCET) 5-325 mg per tablet 1 tablet  1 tablet Oral Q4H PRN    oxyCODONE-acetaminophen (PERCOCET) 5-325 mg per tablet 2 tablet  2 tablet Oral Q4H PRN    witch buster-glycerin (TUCKS) topical pad 1 pad  1 pad Topical PRN       Physical Exam:     Gen: AAOx3, NAD  CV: RRR  Lungs: CTA b/l  Abd: Soft, non-tender, non-distended, no rebound or guarding  Uterine fundus firm and non-tender, 2cm below umbilicus, incisions are c/d/i  Ext: Non tender    Rekha Castro PGY-1  OB/GYN  11/4/2017  8:10 AM

## 2017-11-04 NOTE — LACTATION NOTE
This note was copied from a baby's chart  Mom states infant still not latching on  Is now using a nipple shield brought from home  Infant does suck on shield  Cautions discussed  Infant set up with SNS at breast with shield  infant did well  Mom to feed like that every 3 hours, pump after  Discussed dhow and when to discontinue pumping and shield use  Given discharge breastfeeding pkt and use of feeding log reviewed  Discussed engorgement relief measures and where to call for additional assistance as needed

## 2017-11-05 NOTE — PLAN OF CARE
DISCHARGE PLANNING     Discharge to home or other facility with appropriate resources Completed        INFECTION - ADULT     Absence or prevention of progression during hospitalization Completed     Absence of fever/infection during neutropenic period Completed        Knowledge Deficit     Patient/family/caregiver demonstrates understanding of disease process, treatment plan, medications, and discharge instructions Completed        PAIN - ADULT     Verbalizes/displays adequate comfort level or baseline comfort level Completed        Potential for Falls     Patient will remain free of falls Completed        SAFETY ADULT     Patient will remain free of falls Completed     Maintain or return to baseline ADL function Completed     Maintain or return mobility status to optimal level Completed

## 2017-11-15 ENCOUNTER — GENERIC CONVERSION - ENCOUNTER (OUTPATIENT)
Dept: OTHER | Facility: OTHER | Age: 27
End: 2017-11-15

## 2017-12-22 ENCOUNTER — ALLSCRIPTS OFFICE VISIT (OUTPATIENT)
Dept: OTHER | Facility: OTHER | Age: 27
End: 2017-12-22

## 2017-12-23 NOTE — PROGRESS NOTES
Past Medical History   1  History of Encounter for supervision of normal first pregnancy in third trimester (V22 0)     (Z34 03)   2  History of Need for Tdap vaccination (V06 1) (Z23)   3  History of Patient denies medical problems (V49 89) (Z78 9)  Active Problems And Past Medical History Reviewed: The active problems and past medical history were reviewed and updated today  Surgical History   Surgical History Reviewed: The surgical history was reviewed and updated today  Family History   Mother    1  No pertinent family history  Family History    2  Family history of essential hypertension (V17 49) (Z82 49)   3  Family history of malignant neoplasm of breast (V16 3) (Z80 3)   4  Family history of type 2 diabetes mellitus (V18 0) (Z83 3)  Family History Reviewed: The family history was reviewed and updated today  Social History    · Former smoker (W01 60) (W85 208)   ·   Social History Reviewed: The social history was reviewed and updated today  Current Meds    1  No Reported Medications Recorded  Medication List Reviewed: The medication list was reviewed and updated today  Allergies   1  No Known Drug Allergies  2  No Known Environmental Allergies   3  No Known Food Allergies    Vitals   Vital Signs    Recorded: [id number] 07:29AM   Temperature 51 7 F, Oral   Systolic 406, LUE, Sitting   Diastolic 78, LUE, Sitting   Height 5 ft 3 in   Weight 152 lb    BMI Calculated 26 93   BSA Calculated 1 72   LMP PP     Physical Exam        Constitutional      General appearance: No acute distress, well appearing and well nourished  Pulmonary      Respiratory effort: No increased work of breathing or signs of respiratory distress  Genitourinary      External genitalia: Normal and no lesions appreciated  Vagina: Normal, no lesions or dryness appreciated   -- yeast on exam       Urethral meatus: Normal        Bladder: Normal, soft, non-tender and no prolapse or masses appreciated  Cervix: Normal, no palpable masses  Uterus: Normal, non-tender, not enlarged, and no palpable masses  Adnexa/parametria: Normal, non-tender and no fullness or masses appreciated         Signatures    Electronically signed by : RONDA Aviles; Dec 22 2017  7:51AM EST                       (Author)     Electronically signed by : Lorena Johnson MD; Dec 22 2017 10:46AM EST

## 2018-01-09 NOTE — RESULT NOTES
Verified Results  (1) TISSUE EXAM OB (PLACENTA) ONLY [id number] 10:13PM Trisha Soliman     Test Name Result Flag Reference   LAB AP CASE REPORT (Report)     Surgical Pathology Report             Case: [id number]                   Authorizing Provider: Michael Crowley DO    Collected:      11/02/2017 2213        Ordering Location:   [address] [place] [address]   Received:      11/03/2017 [address] Labor and                                           Delivery                                    Pathologist:      Juan M Talbot DO                               Specimen:  Placenta, Gestational age 38 2   LAB AP FINAL DIAGNOSIS (Report)     A  Placenta:   - Negative for acute umbilical phlebitis, arteritis, and chorionic   vasculitis  - Mild acute subchorionitis/early chorionitis (stage 1; grade 1)  - Placental size and weight (457 grams) are 25th percentile for a 38 2/7   week gestation, with age-appropriate villous   morphology  - Parenchyma with focal villitis, intervillous fibrinoid material with   calcifications and infarct (5% disc volume)  Interpretation performed at [address]    Electronically signed by Juan M Talbot DO on 11/9/2017 at 9:46 AM   LAB AP SURGICAL ADDITIONAL INFORMATION (Report)     All controls performed with the immunohistochemical stains reported above   reacted appropriately  These tests were developed and their performance   characteristics determined by [facility] or   [facility]  They may not be cleared or approved by the [place]  Food and Drug Administration  The FDA has determined that such clearance   or approval is not necessary  These tests are used for clinical purposes  They should not be regarded as investigational or for research  This   laboratory has been approved by [facility], designated as a high-complexity   laboratory and is qualified to perform these tests     LAB AVRIL GROSS DESCRIPTION (Report)     A  The specimen is received in formalin, labeled with the patient's name   and hospital number, and is designated placenta, gestational age 37 4  The specimen consists of a placenta which measures 17 8 x 15 3 x 2 8 cm in   greatest dimension  The umbilical cord inserts 4 2 cm from the peripheral   disc edge and measures 24 3 cm in length by 1 5 cm in average diameter  The cord contains 3 vessels  The fetal surface is smooth, purple gray  The   membranes insert peripherally and are smooth, focally wrinkled purple   gray  The membranes and cord are removed and the placenta weighs 457 g  The maternal aspect appears grossly complete, spongy red-gray and exhibits   possible infarct area(s) measuring up to 3 2 cm in greatest dimension   which encompass less than 5% of the disc volume  Representative sections are submitted as follows:  1: Cord  2: Membrane  3-4: Placenta body  5: Possible infarcts  Note: The estimated total formalin fixation time based upon information   provided by the submitting clinician and the standard processing schedule   is over 72 hours      SRS   LAB AP CLINICAL INFORMATION Chorioamnionitis

## 2018-01-10 NOTE — MISCELLANEOUS
Message   Recorded as Task   Date: 08/14/2017 04:46 PM, Created By: Illa Brunner   Task Name: Follow Up   Assigned To: Nisa Phillip   Regarding Patient: Laron Dailey, Status: In Progress   Mariann Smith - 14 Aug 2017 4:46 PM     TASK CREATED  inform pt one hour GTT abnormal  please order 3 hr GTT   [facility] - 15 Aug 2017 8:26 AM     TASK IN PROGRESS   [facility] - 15 Aug 2017 8:34 AM     TASK REPLIED TO: Previously Assigned To BENJAMIN OB,Team   Pt aware  Instructions reviewed  Rx ordered in [employer] system  Active Problems    1  1St trimester screening (V28 89) (Z36)   2  Abnormal glucose tolerance test (790 22) (R73 02)   3  Pregnancy, first, obstetrical care (V22 0) (Z34 00)    Current Meds   1  Prenatal Vitamin TABS; Therapy: (Recorded:85Cra6431) to Recorded    Allergies    1  No Known Drug Allergies    2  No Known Environmental Allergies   3  No Known Food Allergies    Plan  Abnormal glucose tolerance test, Pregnancy, first, obstetrical care    · (1) GLUCOSE TOLERANCE TEST, 3HR; Status:Active - Retrospective Authorization;   Requested for:[id number];     Signatures   Electronically signed by : Oscar Disla, ; Aug 15 2017  8:35AM EST                       (Author)

## 2018-01-12 NOTE — PROGRESS NOTES
2017         RE: Claudia Oven                                  To: [facility]  MR#: [id number]                                  [address]   : [address]                                  [address]   ENC: [id number]:ANDRE Clemens, [address]   (Exam #: [id number])                           Fax: [phone]      The LMP of this 32year old,  G1, P0-0-0-0 patient was 2017, giving   her an OLIVER of 2017 and a current gestational age of 25 weeks 3 days   by dates  A sonographic examination was performed on 2017 using   real time equipment  The ultrasound examination was performed using   abdominal & vaginal techniques  The patient has a BMI of 24 3  Her blood   pressure today was 108/72  Earliest ultrasound found in her record: 17   9w1d  17 OLIVER      Cardiac motion was observed at 154 bpm       INDICATIONS      fetal anatomical survey      Exam Types      LEVEL II   Transvaginal      RESULTS      Fetus # 1 of 1   Vertex presentation   Fetal growth appeared normal   Placenta Location = Fundal   No placenta previa   Placenta Grade = I      MEASUREMENTS (* Included In Average GA)      AC              16 3 cm        21 weeks 1 day * (64%)   BPD              5 0 cm        21 weeks 1 day * (69%)   HC              18 6 cm        20 weeks 6 days* (59%)   Femur            3 6 cm        21 weeks 4 days* (64%)      Nuchal Fold      3 3 mm   NBL              5 5 mm      Humerus          3 4 cm        21 weeks 3 days  (71%)      Cerebellum       2 2 cm        21 weeks 1 day   Biorbit          3 2 cm        20 weeks 4 days   CisternaMagna    6 1 mm      HC/AC           1 14   FL/AC           0 22   FL/BPD          0 71   EFW (Ac/Fl/Hc)   411 grams - 0 lbs 14 oz      THE AVERAGE GESTATIONAL AGE is 21 weeks 1 day +/- 10 days        AMNIOTIC FLUID         Largest Vertical Pocket = 4 9 cm   Amniotic Fluid: Normal      CERVICAL EVALUATION      SUPINE      Cervical Length: 3 80 cm      OTHER TEST RESULTS           Funneling?: No             Dynamic Changes?: No        Resp  To TFP?: No      ANATOMY      Head                                    Normal   Face/Neck                               Normal   Th  Cav  Normal   Heart                                   Normal   Abd  Cav  Normal   Stomach                                 Normal   Right Kidney                            Normal   Left Kidney                             Normal   Bladder                                 Normal   Abd  Wall                               Normal   Spine                                   Normal   Extrems                                 Normal   Genitalia                               Normal   Placenta                                Normal   Umbl  Cord                              Normal   Uterus                                  Normal   PCI                                     Normal      ANATOMY DETAILS      Visualized Appearing Sonographically Normal:   HEAD: (Calvarium, BPD Level, Cavum, Lateral Ventricles, Choroid Plexus,   Cerebellum, Cisterna Magna);    FACE/NECK: (Neck, Nuchal Fold, Profile,   Orbits, Nose/Lips, Palate, Face);    TH  CAV  : (Lungs, Diaphragm); HEART: (Four Chamber View, Proximal Left Outflow, Proximal Right Outflow,   3VV, 3 Vessel Trachea, Short Axis of Greater Vessels, Ductal Arch, Aortic   Arch, Interventricular Septum, Interatrial Septum, IVC, SVC, Cardiac Axis,   Cardiac Position);    ABD  CAV : (Liver);    STOMACH, RIGHT KIDNEY, LEFT   KIDNEY, BLADDER, ABD  WALL, SPINE: (Cervical Spine, Thoracic Spine, Lumbar   Spine, Sacrum);    EXTREMS: (Lt Humerus, Rt Humerus, Lt Forearm, Rt   Forearm, Lt Hand, Rt Hand, Lt Femur, Rt Femur, Lt Low Leg, Rt Low Leg, Lt   Foot, Rt Foot);    GENITALIA (Male), PLACENTA, UMBL   CORD, UTERUS, PCI      ADNEXA      The left ovary appeared normal and measured 3 2 x 2 7 x 3 4 cm with a   volume of 15 4 cc  The right ovary appeared normal and measured 3 0 x 3 4   x 3 0 cm with a volume of 16 0 cc  IMPRESSION      Finley IUP   21 weeks and 1 day by this ultrasound  (OLIVER=NOV 9 2017)   Vertex presentation   Fetal growth appeared normal   Normal anatomy survey   Regular fetal heart rate of 154 bpm   Fundal placenta   No placenta previa      GENERAL COMMENT      On exam today the patient appears well, in no acute distress, and denies   any complaints  Her abdomen is non-tender  The patient had Stepwise Sequential Screening at our Center reported   through [employer]  Her risk for trisomy 21 before screening was   1:970, after screening her risk is <1:5000; her risk for Trisomy 25 was   <1:5000  The open neural tube defect risk after screening is <1:5000  The fetal anatomic survey is complete  There is no sonographic evidence   of fetal abnormalities at this time  Good fetal movement and tone are   seen  The amniotic fluid volume appears normal   The placenta is fundal   and it appears sonographically normal   A transvaginal ultrasound was   performed to assess the cervix, which was not seen well transabdominally  The cervical length was 3 8 centimeters, which is normal for the current   gestational age  There was no significant funneling or dynamic changes   appreciated  The patient was informed of today's findings and all of her   questions were answered  The limitations of ultrasound were reviewed with   the patient, which she accepts  Recommend further scans as clinically indicated  Precautions were   reviewed  Thank you very much for allowing us to participate in the care of this   very nice patient  Should you have any questions, please do not hesitate   to contact our office  NEFTALY Mccall M D     Electronically signed 06/30/17 15:52

## 2018-01-13 VITALS
WEIGHT: 136.6 LBS | DIASTOLIC BLOOD PRESSURE: 72 MMHG | SYSTOLIC BLOOD PRESSURE: 108 MMHG | BODY MASS INDEX: 24.2 KG/M2 | HEIGHT: 63 IN

## 2018-01-13 NOTE — PROGRESS NOTES
MAY 5 2017         RE: Yasmin                                   To: [facility]  MR#: [id number]                                  [address]   : [address]                                  [address]   ENC: [id number]:LARISA Clemens, [address]   (Exam #: [id number])                           Fax: [phone]      The LMP of this 32year old,  G1, P0-0-0-0 patient was 2017, giving   her an OLIVER of 2017 and a current gestational age of 16 weeks 3 days   by dates  A sonographic examination was performed on MAY 5 2017 using real   time equipment  The ultrasound examination was performed using abdominal   technique  The patient has a BMI of 21 3  Her blood pressure today was   120/79  Earliest ultrasound found in her record: 17   9w1d  17 OLIVER      Thank you very much for your kind referral of Yasmin Blackwood to the   [address] [place] [address] in [place] on May 5, 2017 for first trimester ultrasound   evaluation, genetic screening, and MFM assessment  Daniel Bee is a 80-year-old   primigravida who is currently at 12-3/7 weeks gestation by an estimated   due date of 2017  With the exception of occasional nausea and   vomiting, her prenatal course has been unremarkable so far  Daniel Bee has no   complaints today  She denies vaginal bleeding  Her past medical and   surgical histories are unremarkable  She currently takes no medication   with the exception of a prenatal vitamin on a daily basis and has no known   drug allergy  Daniel Bee has a history of tobacco use predating this pregnancy   but denies current tobacco, alcohol, or illicit drug use  The family   medical history is negative with respect to first degree relatives with   diabetes, hypertension, venous thromboembolism, or thyroid disease  Daniel Bee   has a cousin with Down syndrome   The family genetic history is otherwise   negative with respect to genetic abnormalities, birth defects, or mental   retardation  Multiple longitudinal and transverse sections revealed a finley   intrauterine pregnancy with the fetus in variable presentation  The   placenta is posterior in implantation  Cardiac motion was observed at 159 bpm       INDICATIONS      first trimester genetic screening      Exam Types      Stepwise Sequential Screen      RESULTS      Fetus # 1 of 1   Variable presentation   Fetal growth appeared normal      MEASUREMENTS (* Included In Average GA)      CRL              6 6 cm        12 weeks 5 days*   Nuchal Trans    1 50 mm      THE AVERAGE GESTATIONAL AGE is 12 weeks 5 days +/- 7 days  ANATOMY COMMENTS      Anatomic detail is limited at this gestational age  The yolk sac was not   noted  The fetal cranium appeared normal in shape and the nuchal   translucency was normal in size at 1 5 mm  The nasal bone appears to be   present  The intracranial anatomy was unremarkable  The spine is   suboptimally imaged  Anatomy of the fetal thorax appeared within normal   limits  The cardiac rhythm was regular  Within the abdomen, stomach &   bladder were visualized and the abdominal wall appeared intact  A three   vessel cord appears to be present  Active movement of the fetal body &   extremities was seen  There is no suspicion of a subchorionic bleed  The   placental cord insertion was normal    There is no suspicion of a uterine   myoma  Free fluid is not seen in the posterior cul-de-sac  ADNEXA      The left ovary was not visualized  The right ovary appeared normal and   measured 2 9 x 3 9 x 2 0 cm with a volume of 11 8 cc        AMNIOTIC FLUID         Largest Vertical Pocket = 3 1 cm   Amniotic Fluid: Normal      IMPRESSION      Finley IUP   12 weeks and 5 days by this ultrasound  (OLIVER=NOV 12 2017)   Variable presentation   Fetal growth appeared normal   Regular fetal heart rate of 159 bpm   Posterior placenta      GENERAL COMMENT      Today's ultrasound findings and suggested follow up were discussed in   detail  The Stepwise Sequential Screen was discussed in detail, including   the sensitivity for detection of Down syndrome  We discussed that   definitive prenatal diagnosis is possible only through genetic   amniocentesis or chorionic villus sampling  Abril Smith was given a requisition   for [facility] for a venous blood sample to complete the first   trimester component of the Stepwise Sequential Screen  The second   trimester component should be obtained between 16 and 18 weeks gestation  Level II ultrasound evaluation will be performed at 20 weeks gestation  The face to face time, in addition to time spent discussing ultrasound   results, was approximately 10 minutes, greater than 50% of which was spent   during counseling and coordination of care  NEFTALY Robertson M D  Maternal-Fetal Medicine   Electronically signed 05/05/17 13:21           Electronically signed by:Gurvinder DUFF    May  8 2017  1:07PM EST

## 2018-01-14 VITALS
BODY MASS INDEX: 21.37 KG/M2 | WEIGHT: 120.6 LBS | DIASTOLIC BLOOD PRESSURE: 79 MMHG | HEIGHT: 63 IN | SYSTOLIC BLOOD PRESSURE: 120 MMHG

## 2018-01-14 VITALS
SYSTOLIC BLOOD PRESSURE: 110 MMHG | BODY MASS INDEX: 20.55 KG/M2 | WEIGHT: 116 LBS | DIASTOLIC BLOOD PRESSURE: 72 MMHG | HEIGHT: 63 IN

## 2018-01-15 NOTE — RESULT NOTES
Verified Results  (Q) STEPWISE, PART 1 [id number] 12:00AM Makayla Puckett     Test Name Result Flag Reference   INTERPRETATION SEE NOTE     This patient's risk does not exceed the first trimester  cut-off for Down syndrome or trisomy 18  The integrated  screen calculation is awaiting the second trimester sample  NT WAS USED IN THE RISK CALCULATIONS  Thank you for submitting this patient's Part 1 specimen  These first trimester values will be incorporated with the  second trimester values as part of the integrated testing  process  Please submit the Part 2 specimen between   05/21/2017-07/15/2017 (15 0 and 22 9 weeks gestation) with   05/21/2017-06/03/2017 (15 0 - 16 9 weeks gestation) being  optimal  When submitting Part 2, please include the  following Specimen # from Part 1:  [id number]   AGE RISK DOWN SYNDROME 1:720     MALINDA DOWN SYNDROME RISK <1:5000  <1:50   RISK FOR TRISOMY 18 <1:5000  <1:100   CALCULATED GESTATIONAL$AGE 12 7     Crown rump length (CRL) was used to calculate gestational  age  OLIVER, if provided, was not used for gestational age  dating  MARVIN-A 2663 4 ng/mL     This test was performed using a kit that has not been  cleared or approved by the FDA  The analytical performance  characteristics of this test have been determined by [facility]  This test  should not be used for diagnosis without confirmation by  other medically established means  MARVIN-A MOM 2 26      7 IU/mL     HCG MOM 1 63     NT MOM 1 01     The maternal serum screening results indicate a lower risk  of trisomy 21 in this pregnancy  The nasal bone was assessed  via ultrasound and was present  The combined risk is  therefore likely to be less than the calculated risk  Other  findings later in the pregnancy may change the risk  Nasal bone assessment is best accomplished through a fetal  ultrasound performed between 11 weeks 0 days through 13  weeks 6 days   In assessing the risk for aneuploidy, the  evaluation of the maternal serum markers plus the nuchal  thickness measurement is calculated first  Any potential  change to the patient's risk for aneuploidy depends on the  nuchal thickness, crown-rump length, and the ethnic origin,  and therefore the values generated by the algorithm itself  will not change  Additional information about the assessment  of the fetal nasal bone may be found on the [facility] website at  http://www  fetalmedicine com/fmf/training-certification/cert  ogqliqkp-dr-cokww  tence/11-13-week-scan/assessment-of-the-nasal-bone/  Please note that the Sequential Integrated maternal serum  screen for Down syndrome risk assessment was designed by Dr Dennis Howard ([address], et al J Med Screen 2003 v10 p56-104)  to provide a high detection rate and low false positive rate  when a cutoff of 1:50 is used to identify high risk  pregnancies during the first trimester  Use of any other  cutoff for determination of risk in the first trimester will  result in a higher false positive rate for the two-part  screen  All patients whose risk is lower than 1:50 should  have a second sample submitted to complete the screen  This is a screening test, not a diagnostic test      This risk assessment is based on demographic data provided  by the ordering physician  Please notify the laboratory  promptly if any data are incorrect  If you have questions concerning this report: For clinical consultation, call [phone]; For technical questions, call [phone] ext [phone]; For recalculations, fax to [phone]  For additional information, please refer to  [email or web address]  (This link is provided for informational/educational  purposes only )   REFERRING PHYSICIAN NAME Julius Boyd PHYSICIAN PHONE [phone]     REFERRING PHYSICIAN NPYUMI [id number]     DATE OF BIRTH 1990     COLLECTION DATE 05/05/2017 ULTRASOUND DATE 05/05/2017     ULTRASONOGRAPHER'S NAME AIDA IGNACIO     NTQR ULTRASONOGRAPHER ID# [id number]     NTQR LOCATION ID# NOT GIVEN     NTQR READING PHYS ID# [id number]     [facility] ULTRASONOGRAPHER ID# NOT GIVEN     CROWN RUMP LENGTH 66 mm     NUCHAL TRANSLUCENCY 1 5 mm     IF TWINS NOT GIVEN     TWIN B CRL NOT GIVEN mm     TWIN B NT NOT GIVEN mm     MATERNAL WEIGHT 120 lbs     EST'D DATE OF DELIVERY 11/14/2017     OLIVER DETERMINED BY LMP     MOTHER'S ETHNIC ORIGIN      NUMBER OF FETUSES 1     INSULIN DEPEND DIABETIC NO     REPEAT SPECIMEN NO     HX OF NEURAL TUBE DEFECTS NO     PREV PREG DOWN SYND NO     DONOR EGG NO     DONOR EGG; EGG RETRIEVAL NOT GIVEN     Nasal Bone PRESENT     Twin B Nasal Bone NOT GIVEN

## 2018-01-16 NOTE — RESULT NOTES
Verified Results  (Q) STEPWISE, PART 2 [id number] 01:55PM Kelsey Cardona   REPORT COMMENT:  FASTING:NO     Test Name Result Flag Reference   INTERPRETATION SEE NOTE     SCREEN NEGATIVE FOR OPEN NTD, DOWN SYNDROME AND TRISOMY 18   NT WAS USED IN THE RISK CALCULATIONS  RISK FOR ONTD <1:5000     AGE RISK DOWN SYNDROME 1:970     MALINDA DOWN SYNDROME RISK <1:5000  <1:270   RISK FOR TRISOMY 18 <1:5000  <1:100   CALCULATED GESTATIONAL$AGE 16 7     Crown rump length (CRL) was used to calculate gestational  age  OLIVER, if provided, was not used for gestational age  dating  AFP, SERUM 41 8 ng/mL     AFP MOM 1 00     Reference Range:                                        <2 50                                        IDD        <1 90                                        TWINS      <4 00                                        TWINS IDD  <3 50                                        TRIPLETS   <4 50   HCG, SERUM 68 7 IU/mL     HCG MOM 1 97     ESTRIOL, FREE 0 80 ng/mL     ESTRIOL MOM 0 73     INHIBIN A, DIMERIC 162 pg/mL     INHIBIN A MOM 0 87     MARVIN A 2663 4 ng/mL     This test was performed using a kit that has not been  cleared or approved by the FDA  The analytical performance  characteristics of this test have been determined by [facility]  This test  should not be used for diagnosis without confirmation by  other medically established means  MARVIN-A MOM 2 26     NT MOM 1 01     The maternal serum screening results indicate a lower risk  of trisomy 21 in this pregnancy  The nasal bone was assessed  via ultrasound and was present  The combined risk is  therefore likely to be less than the calculated risk  Other  findings later in the pregnancy may change the risk  Nasal bone assessment is best accomplished through a fetal  ultrasound performed between 11 weeks 0 days through 13  weeks 6 days   In assessing the risk for aneuploidy, the  evaluation of the maternal serum markers plus the nuchal  thickness measurement is calculated first  Any potential  change to the patient's risk for aneuploidy depends on the  nuchal thickness, crown-rump length, and the ethnic origin,  and therefore the values generated by the algorithm itself  will not change  Additional information about the assessment  of the fetal nasal bone may be found on the [facility] website at  [email or web address]  fetalmedicine [employer]/fmf/[email or web address]/cert  [email or web address]-mf-vqvlg  tence/11-13-week-scan/assessment-of-the-nasal-bone/  The Sequential Integrated Screen combines MARVIN-A and hCG  with or without a nuchal translucency measurement in the  first trimester with AFP, unconjugated estriol, intact hCG  and Inhibin A in the second trimester  This provides a  useful screening test for detection of open neural tube  defects, Down syndrome and Trisomy 18  It should be noted  that normal results can never guarantee the birth of a  normal baby and that 2 to 3 percent of newborns have some  type of physical or mental defect, many of which are  undetectable through any known prenatal diagnostic  technique  This is a screening test, not a diagnostic test      This risk assessment is based on demographic data provided  by the ordering physician  Please notify the laboratory  promptly if any data are incorrect  If you have questions concerning this report: For clinical consultation, call [phone]; For technical questions, call [phone] ext [phone]; For recalculations, fax to [phone]     REFERRING PHYSICIAN NAME Christopher Marlow PHYSICIAN PHONE [phone]     REFERRING PHYSICIAN FELIBERTO [phone]     SPECIMEN # FROM PART 1 [id number]     DATE OF BIRTH 1990     COLLECTION DATE 06/02/2017     OLIVER: 11/14/2017     NUCHAL TRANSLUCENCY 1 5 mm     MOTHER'S ETHNIC ORIGIN      INSULIN DEPEND DIABETIC NO     REPEAT SPECIMEN NO     NUMBER OF FETUSES 1     HX OF NEURAL TUBE DEFECTS NO MATERNAL WEIGHT 120 lbs     Crown Rump Length 66 mm     Ultrasound Date 05/05/2017     Nasal Bone PRESENT     Twin B Nasal Bone NOT GIVEN     For additional information, please refer to  http://[employer]/faq/FAQ71  (This link is provided for more informational/educational  purposes only )

## 2018-01-22 VITALS
WEIGHT: 173 LBS | SYSTOLIC BLOOD PRESSURE: 120 MMHG | HEIGHT: 63 IN | BODY MASS INDEX: 30.65 KG/M2 | DIASTOLIC BLOOD PRESSURE: 74 MMHG

## 2018-01-22 VITALS
WEIGHT: 162 LBS | DIASTOLIC BLOOD PRESSURE: 78 MMHG | SYSTOLIC BLOOD PRESSURE: 122 MMHG | HEIGHT: 63 IN | BODY MASS INDEX: 28.7 KG/M2

## 2018-01-22 VITALS
DIASTOLIC BLOOD PRESSURE: 78 MMHG | WEIGHT: 155 LBS | HEIGHT: 63 IN | BODY MASS INDEX: 27.46 KG/M2 | SYSTOLIC BLOOD PRESSURE: 122 MMHG

## 2018-01-22 VITALS
HEIGHT: 63 IN | WEIGHT: 149.6 LBS | BODY MASS INDEX: 26.51 KG/M2 | SYSTOLIC BLOOD PRESSURE: 118 MMHG | DIASTOLIC BLOOD PRESSURE: 72 MMHG

## 2018-01-22 VITALS
BODY MASS INDEX: 24.1 KG/M2 | SYSTOLIC BLOOD PRESSURE: 120 MMHG | WEIGHT: 136 LBS | HEIGHT: 63 IN | DIASTOLIC BLOOD PRESSURE: 80 MMHG

## 2018-01-22 VITALS
DIASTOLIC BLOOD PRESSURE: 74 MMHG | SYSTOLIC BLOOD PRESSURE: 128 MMHG | HEIGHT: 63 IN | WEIGHT: 166 LBS | BODY MASS INDEX: 29.41 KG/M2

## 2018-01-22 VITALS
SYSTOLIC BLOOD PRESSURE: 120 MMHG | HEIGHT: 63 IN | BODY MASS INDEX: 30.65 KG/M2 | DIASTOLIC BLOOD PRESSURE: 78 MMHG | WEIGHT: 173 LBS

## 2018-01-22 VITALS
DIASTOLIC BLOOD PRESSURE: 80 MMHG | BODY MASS INDEX: 29.59 KG/M2 | SYSTOLIC BLOOD PRESSURE: 126 MMHG | HEIGHT: 63 IN | WEIGHT: 167 LBS

## 2018-01-22 VITALS
SYSTOLIC BLOOD PRESSURE: 106 MMHG | WEIGHT: 116 LBS | HEIGHT: 63 IN | DIASTOLIC BLOOD PRESSURE: 64 MMHG | BODY MASS INDEX: 20.55 KG/M2

## 2018-01-22 VITALS — SYSTOLIC BLOOD PRESSURE: 130 MMHG | DIASTOLIC BLOOD PRESSURE: 70 MMHG

## 2018-01-22 VITALS
BODY MASS INDEX: 22.32 KG/M2 | SYSTOLIC BLOOD PRESSURE: 140 MMHG | HEIGHT: 63 IN | WEIGHT: 126 LBS | DIASTOLIC BLOOD PRESSURE: 62 MMHG

## 2018-01-22 VITALS
BODY MASS INDEX: 20.91 KG/M2 | DIASTOLIC BLOOD PRESSURE: 66 MMHG | SYSTOLIC BLOOD PRESSURE: 110 MMHG | HEIGHT: 63 IN | WEIGHT: 118 LBS

## 2018-01-23 VITALS
TEMPERATURE: 98.7 F | HEIGHT: 63 IN | BODY MASS INDEX: 26.93 KG/M2 | WEIGHT: 152 LBS | SYSTOLIC BLOOD PRESSURE: 102 MMHG | DIASTOLIC BLOOD PRESSURE: 78 MMHG

## 2018-01-23 NOTE — MISCELLANEOUS
Message  Return to work or school:   Nikolas Nguyen is under my professional care  She was seen in my office on 12/22/2017   She is able to return to work on  12/22/2017      Pt is cleared to go back to work after delivery of child on 11/2/17  RONDA Hugn        Signatures   Electronically signed by : RONDA Hung; Dec 22 2017  8:35AM EST                       (Author)    Electronically signed by : Michael Hung; Dec 22 2017  8:35AM EST                       (Author)

## 2018-03-07 NOTE — PROGRESS NOTES
Education  Baby & Me Education 3rd Trimester: Third Trimester Education provided:   benefits of breastfeeding, importance of exclusive breastfeeding, early initiation of breastfeeding, exclusive breastfeeding for the first 6 months, frequent feedings for optimal milk production, feeding on demand/baby-led feedings, effective positioning and attachment, importance of breastfeeding after 6 months following introduction of other foods, non-pharmacologic pain relief methods for labor and importance of early skin-to-skin contact  rooming-in on 24 hour basis   The patient is planning on breastfeeding  The patient is planning on exclusively breastfeeding until the baby is 10months of age  Mother has not registered for prenatal class  Thought has been given to selecting a pediatrician  The mother has discussed personal preferences with her provider  Prenatal education provided by: JODY Boone      Signatures   Electronically signed by : RONDA Zamora; Aug 23 2017  3:32PM EST                       (Author)    Electronically signed by : RONDA Zamora;  Aug 23 2017  3:33PM EST                       (Author)

## 2018-03-07 NOTE — PROGRESS NOTES
[employer] Education 1st Trimester:   First Trimester Education provided:   benefits of breastfeeding, importance of exclusive breastfeeding, early initiation of breastfeeding and exclusive breastfeeding for the first 6 months   The patient is planning on breastfeeding  Prenatal education provided by: Bella Teixeira MA      Signatures   Electronically signed by :  Cordell Klinefelter, ; Apr 14 2017 10:21AM EST                       (Co-author)    Electronically signed by : Amber Lainez MD; Apr 14 2017 12:32PM EST

## 2019-01-17 ENCOUNTER — OFFICE VISIT (OUTPATIENT)
Dept: OBGYN CLINIC | Facility: MEDICAL CENTER | Age: 29
End: 2019-01-17
Payer: COMMERCIAL

## 2019-01-17 VITALS
SYSTOLIC BLOOD PRESSURE: 110 MMHG | RESPIRATION RATE: 14 BRPM | BODY MASS INDEX: 27.11 KG/M2 | WEIGHT: 153 LBS | DIASTOLIC BLOOD PRESSURE: 70 MMHG | HEIGHT: 63 IN

## 2019-01-17 DIAGNOSIS — N91.2 AMENORRHEA: Primary | ICD-10-CM

## 2019-01-17 PROCEDURE — 76817 TRANSVAGINAL US OBSTETRIC: CPT | Performed by: OBSTETRICS & GYNECOLOGY

## 2019-01-17 PROCEDURE — 99213 OFFICE O/P EST LOW 20 MIN: CPT | Performed by: OBSTETRICS & GYNECOLOGY

## 2019-01-17 NOTE — PROGRESS NOTES
Procedures  Early OB Ultrasound Procedure Note    Referring Physician: Self, Referral     Technician: Study performed by the interpreting physician    Indications:  Early gestation, uncertain dating    Procedure Details   The entire study was done at a setting of 6 5 MHz  The cul-de-sac is free and contains no fluid  Measurements accompanying this report are noted  A gestational sac is seen containing a yolk sac and a sparrow embryo  No subchorionic lucency is noted  The embryonic crown-rump length calculates to an estimated gestational age of 11 weeks, 5 days  Embryonic cardiac activity is seen at a rate of 150 b/min  Placental location:  Description of fetal anatomy Normal  Description of ovaries:normal  Description of uterus:normal    Findings:  Viable, sparrow intrauterine pregnancy at 8 weeks, 5 days, with a calculated EDC of 8/24/19

## 2019-01-17 NOTE — PROGRESS NOTES
Assessment/Plan:      Diagnoses and all orders for this visit:    Amenorrhea          Subjective:     Patient ID: Rohini Oconnor is a 29 y o  female  Patient is a 77-year-old female, para 1, at 9 weeks here for an early ultrasound  Last menstrual period was November 15, 2018  Review of systems is negative for vaginal bleeding or pelvic pain  Past medical history significant for obesity  Past surgical history is negative  Current medications are prenatal vitamins  No known drug allergies  Family history significant for a cousin with Down syndrome  Patient received chickenpox vaccine as a child  Patient denies a history of genital herpes  Review of Systems   Gastrointestinal: Negative for abdominal pain  Genitourinary: Negative for vaginal bleeding  Objective:     Physical Exam   Constitutional: She is oriented to person, place, and time  She appears well-developed and well-nourished  HENT:   Head: Normocephalic  Pulmonary/Chest: Effort normal    Neurological: She is alert and oriented to person, place, and time  Psychiatric: She has a normal mood and affect   Her behavior is normal

## 2019-01-28 ENCOUNTER — INITIAL PRENATAL (OUTPATIENT)
Dept: OBGYN CLINIC | Facility: MEDICAL CENTER | Age: 29
End: 2019-01-28
Payer: COMMERCIAL

## 2019-01-28 VITALS
WEIGHT: 151 LBS | SYSTOLIC BLOOD PRESSURE: 124 MMHG | DIASTOLIC BLOOD PRESSURE: 70 MMHG | BODY MASS INDEX: 26.75 KG/M2 | HEIGHT: 63 IN

## 2019-01-28 DIAGNOSIS — Z34.81 PRENATAL CARE, SUBSEQUENT PREGNANCY, FIRST TRIMESTER: Primary | ICD-10-CM

## 2019-01-28 PROCEDURE — 90686 IIV4 VACC NO PRSV 0.5 ML IM: CPT | Performed by: OBSTETRICS & GYNECOLOGY

## 2019-01-28 PROCEDURE — 90471 IMMUNIZATION ADMIN: CPT | Performed by: OBSTETRICS & GYNECOLOGY

## 2019-01-28 PROCEDURE — OBC: Performed by: OBSTETRICS & GYNECOLOGY

## 2019-01-28 RX ORDER — CALCIUM CARBONATE 200(500)MG
4 TABLET,CHEWABLE ORAL 2 TIMES DAILY
COMMUNITY
End: 2019-09-16

## 2019-01-28 NOTE — PROGRESS NOTES
OB INTAKE INTERVIEW  * Pt presents for OB intake  * Accompanied by: Mason Tracey  *  *Hx of  delivery prior to 36 weeks 6 days NO  *Last Menstrual Period: Pt's LMP was 11/15/18  *Ultrasound date:19   8weeks 5days  *Estimated date of delivery: 19   * confirmed by LMP    *Signs/Symptoms of Pregnancy   *ACID REFLUX, N/V DALIY    *constipation NO   *headaches NO   *cramping/spotting NO   *PICA cravings NO  *Diabetes- if you answer yes, please order 1 hour GTT, 50g   *hx of GDM NO   *BMI >35 NO   *first degree relative with type 2 diabetes NO   *hx of PCOS NO   *current metformin use NO   *prior hx of LGA/macrosomia NON   *AMA with other risk factors NO  *Hypertension- if you answer yes, please order preeclampsia labs including 24 hour urine protein   *Hx of chronic HTN NO   *hx of gestational HTN NO   *hx of preeclampsia, eclampsia, or HELLP syndrome NO  *Infection Screening-    *does the pt have a hx of MRSA? NO   *if yes- please follow MRSA protocol and obtain a nasal swab for MRSA culture   *history of herpes? NO  *Immunizations:   *influenza vaccine given today YES          *discussed Tdap vaccine    *Interview education   *[employer]'s Pregnancy Essentials Book reviewed and discussed   *Handouts given:    *Baby and Me phone edilberto guide    *Baby and Me support center    *[facility]     *discussed genetic testing- pt interested YES, hx of cousin with Down Syndrome     *appointment at [place] [id number] made YES  Level II US scheduled but they have to call pt back with Nuchal date  *Prenatal lab work scripts   *I have these concerns about this prenatal patient: Pt is having N/V everyday-did offer her the unisom/B6 to try but she states that she wants to see if it will pass  *Details that I feel the provider should be aware of: She delivered with us 2017    PN1 visit scheduled  The patient was oriented to our practice and all questions were answered      Interviewed by: Jean Garcia RN

## 2019-01-28 NOTE — PATIENT INSTRUCTIONS
First Trimester Pregnancy   WHAT YOU NEED TO KNOW:   The first trimester of pregnancy lasts from your last period through the 13th week of pregnancy  Follow up with your healthcare provider for prenatal care  Regular prenatal care can help to keep you and your baby healthy  DISCHARGE INSTRUCTIONS:   Return to the emergency department if:   · You have pain or cramping in your abdomen or low back  · You have severe vaginal bleeding or clotting  Contact your healthcare provider or obstetrician if:   · You have light bleeding  · You have chills or a fever  · You have vaginal itching, burning, or pain  · You have yellow, green, white, or foul-smelling vaginal discharge  · You have pain or burning when you urinate, less urine than usual, or pink or bloody urine  · You have questions or concerns about your condition or care  Follow up with your healthcare provider or obstetrician as directed:  Go to all of your prenatal visits during your pregnancy  Write down your questions so you remember to ask them during your visits  Stay healthy during pregnancy:   · Take prenatal vitamins as directed  Prenatal vitamins can help you get the amount of vitamins and minerals you need during pregnancy  Prenatal vitamins may also decrease the risk of certain birth defects  · Eat a variety of healthy foods  Healthy foods include fruits, vegetables, whole-grain breads, low-fat dairy products, beans, turkey and chicken, and lean red meat  Ask your healthcare provider for more information about foods that are healthy and safe to eat during pregnancy  · Drink liquids as directed  Ask how much liquid to drink each day and which liquids are best for you  Some healthy liquids include milk, water, and juice  It is not clear how caffeine affects pregnancy  Limit your intake of caffeine to less than 200 mg each day to avoid possible health problems   Caffeine may be found in coffee, tea, cola, sports drinks, and chocolate  Do not drink alcohol  · Talk to your healthcare provider before you take medicines  Many medicines can harm your baby, especially during early pregnancy  Ask your healthcare provider before you take any medicines, including over-the-counter medicines, vitamins, herbs, or food supplements  Never use illegal or street drugs while you are pregnant  Talk to your healthcare provider if you are having trouble quitting street drugs  · Exercise  Ask your healthcare provider about the best exercise plan for you  Exercise may help you feel better and make your labor and delivery easier  · Do not smoke  Smoking can cause problems during pregnancy  It can also cause your baby to weigh less at birth  If you smoke, it is never too late to quit  Ask for information if you need help quitting  Safety tips:   · Do not use a hot tub or sauna  while you are pregnant, especially during your first trimester  Hot tubs and saunas may raise your baby's temperature and increase the risk of birth defects  It also increases your risk of miscarriage  · Protect yourself from illness  Toxoplasmosis is a disease that can cause birth defects  To protect yourself from this disease, do not clean your cat's litter box yourself  Ask someone else to clean your cat's litter box while you are pregnant  Also, do not  eat raw meat or unwashed fruits and vegetables  Wash your hands after you touch raw meat, and eat only well-cooked meat  Wash fruits and vegetables well before you eat them  © 2017 [address] Information is for End User's use only and may not be sold, redistributed or otherwise used for commercial purposes  All illustrations and images included in CareNotes® are the copyrighted property of A D A M , Inc  or Billy [employer]  The above information is an  only  It is not intended as medical advice for individual conditions or treatments   Talk to your doctor, nurse or pharmacist before following any medical regimen to see if it is safe and effective for you

## 2019-02-04 LAB
EXTERNAL ABO GROUPING: NORMAL
EXTERNAL HEMATOCRIT: 40 %
EXTERNAL HEMOGLOBIN: 13.9 G/DL
EXTERNAL HEPATITIS B SURFACE ANTIGEN: NORMAL
EXTERNAL HIV-1 ANTIBODY: NORMAL
EXTERNAL PLATELET COUNT: 246 K/ΜL
EXTERNAL RH FACTOR: POSITIVE
EXTERNAL RUBELLA IGG QUANTITATION: NORMAL
EXTERNAL SYPHILIS RPR SCREEN: NORMAL

## 2019-02-11 ENCOUNTER — INITIAL PRENATAL (OUTPATIENT)
Dept: OBGYN CLINIC | Facility: CLINIC | Age: 29
End: 2019-02-11

## 2019-02-11 VITALS — SYSTOLIC BLOOD PRESSURE: 112 MMHG | WEIGHT: 152 LBS | DIASTOLIC BLOOD PRESSURE: 72 MMHG | BODY MASS INDEX: 26.93 KG/M2

## 2019-02-11 DIAGNOSIS — Z11.3 SCREENING FOR STD (SEXUALLY TRANSMITTED DISEASE): ICD-10-CM

## 2019-02-11 DIAGNOSIS — Z34.91 FIRST TRIMESTER PREGNANCY: Primary | ICD-10-CM

## 2019-02-11 PROCEDURE — 87591 N.GONORRHOEAE DNA AMP PROB: CPT | Performed by: OBSTETRICS & GYNECOLOGY

## 2019-02-11 PROCEDURE — 87491 CHLMYD TRACH DNA AMP PROBE: CPT | Performed by: OBSTETRICS & GYNECOLOGY

## 2019-02-11 PROCEDURE — PNV: Performed by: OBSTETRICS & GYNECOLOGY

## 2019-02-11 NOTE — PROGRESS NOTES
1ST OB Visit  Pap: 5/2/2017 neg  Gc/Ch today  Delivery consent signed  Complains of nausea and vomiting constantly

## 2019-02-11 NOTE — PROGRESS NOTES
Initial OB H&P      Subjective     Boyd Jha is a 29 y o  Lulu Stade being seen today for her first obstetrical visit  This is a planned pregnancy  She is at 12w4d gestation  Her obstetrical history is significant for previous delivery being complicated by chorioamnionitis  Relationship with FOB: spouse, living together  Patient does not intend to breast feed  Pregnancy history fully reviewed  Menstrual History:  Patient's last menstrual period was 11/15/2018 (exact date)  No history of cervical dysplasia or STD's    OB History        2    Para   1    Term   1            AB        Living   1       SAB        TAB        Ectopic        Multiple   0    Live Births   1              FT  male "Misti Haider"  1g    Past Medical History:   Diagnosis Date    Obese     Oral infection     CHRONIC PROBLEM WITH IMPACTED TEETH    Varicella     vaccine       No past surgical history on file        Current Outpatient Medications:     calcium carbonate (TUMS) 500 mg chewable tablet, Chew 1 tablet 2 (two) times a day, Disp: , Rfl:     Prenatal Vit-Fe Fumarate-FA (PRENATAL VITAMIN PO), Take 1 tablet by mouth daily, Disp: , Rfl:        No Known Allergies    Family History   Problem Relation Age of Onset    Miscarriages / Stillbirths Mother     Cancer Maternal Aunt 50        BREAST    Diabetes Paternal Grandmother     No Known Problems Father     No Known Problems Brother     No Known Problems Son     No Known Problems Paternal Grandfather      Social History     Socioeconomic History    Marital status: /Civil Union     Spouse name: Not on file    Number of children: Not on file    Years of education: Not on file    Highest education level: Not on file   Occupational History    Not on file   Social Needs    Financial resource strain: Not on file    Food insecurity:     Worry: Not on file     Inability: Not on file    Transportation needs:     Medical: Not on file     Non-medical: Not on file   Tobacco Use    Smoking status: Former Smoker     Last attempt to quit: 2018     Years since quittin 1    Smokeless tobacco: Never Used   Substance and Sexual Activity    Alcohol use: No     Comment: SOCIAL-NOT DURING PREG    Drug use: Yes     Types: Marijuana     Comment: FEW TIMES A YEAR-NOT DURING PREG    Sexual activity: Yes     Partners: Male     Comment: fob-Octavio                                                                                                                   Review of Systems   Constitution: Positive for decreased appetite  Negative for fever, weight gain and weight loss  Hematologic/Lymphatic: Does not bruise/bleed easily  Skin: Negative for suspicious lesions  Musculoskeletal: Negative for falls  Gastrointestinal: Positive for nausea and vomiting  Negative for bloating and abdominal pain  Genitourinary: Negative for dysuria, frequency, non-menstrual bleeding and pelvic pain  Neurological: Negative for headaches  Psychiatric/Behavioral: Negative for depression  Objective  Vitals:    19 1303   BP: 112/72     Physical Exam   Constitutional: She is oriented to person, place, and time  She appears well-developed and well-nourished  Genitourinary: Vagina normal  There is no lesion on the right labia  There is no lesion on the left labia  Right adnexum does not display mass and does not display tenderness  Left adnexum does not display mass and does not display tenderness  Cervix is not nulliparous  Cervix does not exhibit motion tenderness or lesion  Uterus is enlarged  Uterus is not tender  HENT:   Head: Normocephalic  Neck: No thyromegaly present  Cardiovascular: Normal rate, regular rhythm and normal heart sounds  Pulmonary/Chest: Effort normal and breath sounds normal  Right breast exhibits no mass, no nipple discharge and no tenderness  Left breast exhibits no mass, no nipple discharge and no tenderness  Abdominal: Soft   She exhibits no distension  There is no tenderness  Lymphadenopathy:     She has no axillary adenopathy  Neurological: She is alert and oriented to person, place, and time  Skin: Skin is warm and dry  Psychiatric: She has a normal mood and affect          Bedside Sono: viable single IUP FHM 150bpm       Impression:  29 y o   @ 12 weeks 4 days by LMP c/w 1st trimester sono  N/V of pregnancy    Plan:  Had prenatal labs at [address] last week - will call for results  Going for step 1 of SQS tomorrow @ [facility]   Declines medications for n/v  Return to office 4 weeks

## 2019-02-12 ENCOUNTER — ROUTINE PRENATAL (OUTPATIENT)
Dept: PERINATAL CARE | Facility: MEDICAL CENTER | Age: 29
End: 2019-02-12
Payer: COMMERCIAL

## 2019-02-12 ENCOUNTER — PATIENT MESSAGE (OUTPATIENT)
Dept: PERINATAL CARE | Facility: MEDICAL CENTER | Age: 29
End: 2019-02-12

## 2019-02-12 VITALS
SYSTOLIC BLOOD PRESSURE: 114 MMHG | WEIGHT: 151.6 LBS | HEIGHT: 63 IN | BODY MASS INDEX: 26.86 KG/M2 | HEART RATE: 77 BPM | DIASTOLIC BLOOD PRESSURE: 79 MMHG

## 2019-02-12 DIAGNOSIS — Z36.82 NUCHAL TRANSLUCENCY OF FETUS ON PRENATAL ULTRASOUND: Primary | ICD-10-CM

## 2019-02-12 DIAGNOSIS — Z3A.12 12 WEEKS GESTATION OF PREGNANCY: ICD-10-CM

## 2019-02-12 DIAGNOSIS — Z34.81 PRENATAL CARE, SUBSEQUENT PREGNANCY, FIRST TRIMESTER: ICD-10-CM

## 2019-02-12 LAB
C TRACH DNA SPEC QL NAA+PROBE: NEGATIVE
N GONORRHOEA DNA SPEC QL NAA+PROBE: NEGATIVE

## 2019-02-12 PROCEDURE — 76813 OB US NUCHAL MEAS 1 GEST: CPT | Performed by: OBSTETRICS & GYNECOLOGY

## 2019-02-13 ENCOUNTER — TRANSCRIBE ORDERS (OUTPATIENT)
Dept: OBGYN CLINIC | Facility: CLINIC | Age: 29
End: 2019-02-13

## 2019-02-13 ENCOUNTER — TELEPHONE (OUTPATIENT)
Dept: OBGYN CLINIC | Facility: CLINIC | Age: 29
End: 2019-02-13

## 2019-02-22 LAB
# FETUSES US: 1
AGE: NORMAL
B-HCG ADJ MOM SERPL: 1.3
B-HCG SERPL-ACNC: 96.9 IU/ML
COLLECT DATE: NORMAL
CURRENT SMOKER: NORMAL
DONATED EGG PATIENT QL: NO
FET CRL US.MEAS: 67 MM
FET CRL US.MEAS: NORMAL MM
FET NUCHAL FOLD MOM THICKNESS US.MEAS: 1.2
FET NUCHAL FOLD THICKNESS US.MEAS: 1.8 MM
FET NUCHAL FOLD THICKNESS US.MEAS: NORMAL MM
FET TS 21 RISK FROM MAT AGE: NORMAL
GA CLIN EST: 13.7 WK
GA METHOD: NORMAL
HX OF NTD NARR: NO
HX OF TRISOMY 21 NARR: NO
IDDM PATIENT QL: NO
INTEGRATED SCN PATIENT-IMP: NORMAL
PAPP-A MOM SERPL: 1.28
PAPP-A SERPL-MCNC: 1622.3 NG/ML
PHYSICIAN NPI: NORMAL
SL AMB NASAL BONE: NORMAL
SL AMB NTQR LOCATION ID#: NORMAL
SL AMB NTQR READING PHYS ID#: NORMAL
SL AMB REFERRING PHYSICIAN NAME: NORMAL
SL AMB REFERRING PHYSICIAN PHONE: NORMAL
SL AMB REPEAT SPECIMEN: NO
SL AMB TWIN B NASAL BONE: NORMAL
SONOGRAPHER NAME: NORMAL
SONOGRAPHER: NORMAL
SONOGRAPHER: NORMAL
TS 18 RISK FETUS: NORMAL
TS 21 RISK FETUS: NORMAL
US DATE: NORMAL
US FETUSES STUDY [IMP]: NORMAL

## 2019-03-11 ENCOUNTER — ROUTINE PRENATAL (OUTPATIENT)
Dept: OBGYN CLINIC | Age: 29
End: 2019-03-11

## 2019-03-11 VITALS — BODY MASS INDEX: 27.1 KG/M2 | SYSTOLIC BLOOD PRESSURE: 110 MMHG | DIASTOLIC BLOOD PRESSURE: 70 MMHG | WEIGHT: 153 LBS

## 2019-03-11 DIAGNOSIS — Z34.80 SUPERVISION OF OTHER NORMAL PREGNANCY, ANTEPARTUM: ICD-10-CM

## 2019-03-11 PROCEDURE — PNV: Performed by: NURSE PRACTITIONER

## 2019-03-11 NOTE — PROGRESS NOTES
Supervision of other normal pregnancy, antepartum  Return OB  Denies LOF, vag blding, ctxs, cramping and reports feeling "flutters" for FM  Taking PNV daily as prescribed  Denies any problems today  Will need TDap/Fridge sheet & Perineal massage  Reviewed SAB precautions, Level II scheduled

## 2019-03-11 NOTE — ASSESSMENT & PLAN NOTE
Return OB  Denies LOF, vag blding, ctxs, cramping and reports feeling "flutters" for FM  Taking PNV daily as prescribed  Denies any problems today  Will need TDap/Fridge sheet & Perineal massage  Reviewed SAB precautions, Level II scheduled

## 2019-03-11 NOTE — PATIENT INSTRUCTIONS
15  Pregnancy at 15 to 18 Weeks   AMBULATORY CARE:   What changes are happening to your body:  Now that you are in your second trimester, you have more energy  You may also feel hungrier than usual  You may start to experience other symptoms, such as heartburn or dizziness  You may be gaining about ½ to 1 pound a week, and your pregnancy is beginning to show  You may need to start wearing maternity clothes  Seek care immediately if:   · You have pain or cramping in your abdomen or low back  · You have heavy vaginal bleeding or clotting  · You pass material that looks like tissue or large clots  Collect the material and bring it with you  Contact your healthcare provider if:   · You cannot keep food or drinks down, and you are losing weight  · You have light bleeding  · You have chills or a fever  · You have vaginal itching, burning, or pain  · You have yellow, green, white, or foul-smelling vaginal discharge  · You have pain or burning when you urinate, less urine than usual, or pink or bloody urine  · You have questions or concerns about your condition or care  How to care for yourself at this stage of your pregnancy:   · Manage heartburn  by eating 4 or 5 small meals each day instead of large meals  Avoid spicy foods  Avoid eating right before bedtime  · Manage nausea and vomiting  Avoid fatty and spicy foods  Eat small meals throughout the day instead of large meals  Zuleyma may help to decrease nausea  Ask your healthcare provider about other ways of decreasing nausea and vomiting  · Eat a variety of healthy foods  Healthy foods include fruits, vegetables, whole-grain breads, low-fat dairy foods, beans, lean meats, and fish  Drink liquids as directed  Ask how much liquid to drink each day and which liquids are best for you  Limit caffeine to less than 200 milligrams each day  Limit your intake of fish to 2 servings each week   Choose fish low in mercury such as canned light tuna, shrimp, salmon, cod, or tilapia  Do not  eat fish high in mercury such as swordfish, tilefish, anna mackerel, and shark  · Take prenatal vitamins as directed  Your need for certain vitamins and minerals, such as folic acid, increases during pregnancy  Prenatal vitamins provide some of the extra vitamins and minerals you need  Prenatal vitamins may also help to decrease the risk of certain birth defects  · Do not smoke  If you smoke, it is never too late to quit  Smoking increases your risk of a miscarriage and other health problems during your pregnancy  Smoking can cause your baby to be born too early or weigh less at birth  Ask your healthcare provider for information if you need help quitting  · Do not drink alcohol  Alcohol passes from your body to your baby through the placenta  It can affect your baby's brain development and cause fetal alcohol syndrome (FAS)  FAS is a group of conditions that causes mental, behavior, and growth problems  · Talk to your healthcare provider before you take any medicines  Many medicines may harm your baby if you take them when you are pregnant  Do not take any medicines, vitamins, herbs, or supplements without first talking to your healthcare provider  Never use illegal or street drugs (such as marijuana or cocaine) while you are pregnant  Safety tips during pregnancy:   · Avoid hot tubs and saunas  Do not use a hot tub or sauna while you are pregnant, especially during your first trimester  Hot tubs and saunas may raise your baby's temperature and increase the risk of birth defects  · Avoid toxoplasmosis  This is an infection caused by eating raw meat or being around infected cat feces  It can cause birth defects, miscarriages, and other problems  Wash your hands after you touch raw meat  Make sure any meat is well-cooked before you eat it  Avoid raw eggs and unpasteurized milk   Use gloves or ask someone else to clean your cat's litter box while you are pregnant  Changes that are happening with your baby:  By 18 weeks, your baby may be about 6 inches long from the top of the head to the rump (baby's bottom)  Your baby may weigh about 11 ounces  You may be able to feel your baby's movement at about 18 weeks or later  The first movements may not be that noticeable  They may feel like a fluttering sensation  Your baby also makes sucking movements and can hear certain sounds  What you need to know about prenatal care:  During the first 28 weeks of your pregnancy, you will see your healthcare provider once a month  Your healthcare provider will check your blood pressure and weight  You may also need any of the following:  · A urine test  may also be done to check for sugar and protein  These can be signs of gestational diabetes or infection  · A blood test  may be done to check for anemia (low iron level)  · Fundal height check  is a measurement of your uterus to check your baby's growth  This number is usually the same as the number of weeks that you have been pregnant  · An ultrasound  may be done to check your baby's development  Your healthcare provider may be able to tell you what your baby's gender is during the ultrasound  · Your baby's heart rate  will be checked  © 2017 [address] Information is for End User's use only and may not be sold, redistributed or otherwise used for commercial purposes  All illustrations and images included in CareNotes® are the copyrighted property of [employer]  or Billy [employer]  The above information is an  only  It is not intended as medical advice for individual conditions or treatments  Talk to your doctor, nurse or pharmacist before following any medical regimen to see if it is safe and effective for you

## 2019-03-11 NOTE — PROGRESS NOTES
16 wk, 4D, patient is here for prenatal visit    Good fetal movement, no pain or pressure  Although patient does state she believes she has lost a plug or two as she has had some discharge

## 2019-03-19 ENCOUNTER — TELEPHONE (OUTPATIENT)
Dept: PERINATAL CARE | Facility: CLINIC | Age: 29
End: 2019-03-19

## 2019-03-19 NOTE — TELEPHONE ENCOUNTER
Spoke with patient reviewed Part 2 [facility] Sequential Screen needed and TRF mailed, patient aware of collection needed and date range to complete

## 2019-04-05 ENCOUNTER — ROUTINE PRENATAL (OUTPATIENT)
Dept: PERINATAL CARE | Facility: MEDICAL CENTER | Age: 29
End: 2019-04-05
Payer: COMMERCIAL

## 2019-04-05 VITALS
HEART RATE: 76 BPM | SYSTOLIC BLOOD PRESSURE: 121 MMHG | DIASTOLIC BLOOD PRESSURE: 78 MMHG | HEIGHT: 63 IN | WEIGHT: 160.8 LBS | BODY MASS INDEX: 28.49 KG/M2

## 2019-04-05 DIAGNOSIS — Z36.3 ENCOUNTER FOR ANTENATAL SCREENING FOR MALFORMATIONS: Primary | ICD-10-CM

## 2019-04-05 DIAGNOSIS — Z3A.20 20 WEEKS GESTATION OF PREGNANCY: ICD-10-CM

## 2019-04-05 DIAGNOSIS — Z36.86 ENCOUNTER FOR ANTENATAL SCREENING FOR CERVICAL LENGTH: ICD-10-CM

## 2019-04-05 PROBLEM — E66.09 CLASS 1 OBESITY DUE TO EXCESS CALORIES IN ADULT: Status: RESOLVED | Noted: 2017-11-02 | Resolved: 2019-04-05

## 2019-04-05 PROCEDURE — 76805 OB US >/= 14 WKS SNGL FETUS: CPT | Performed by: OBSTETRICS & GYNECOLOGY

## 2019-04-05 PROCEDURE — 76817 TRANSVAGINAL US OBSTETRIC: CPT | Performed by: OBSTETRICS & GYNECOLOGY

## 2019-04-08 ENCOUNTER — ROUTINE PRENATAL (OUTPATIENT)
Dept: OBGYN CLINIC | Age: 29
End: 2019-04-08

## 2019-04-08 VITALS — DIASTOLIC BLOOD PRESSURE: 68 MMHG | SYSTOLIC BLOOD PRESSURE: 110 MMHG | WEIGHT: 163.4 LBS | BODY MASS INDEX: 28.95 KG/M2

## 2019-04-08 DIAGNOSIS — Z3A.20 20 WEEKS GESTATION OF PREGNANCY: ICD-10-CM

## 2019-04-08 DIAGNOSIS — Z34.80 SUPERVISION OF OTHER NORMAL PREGNANCY, ANTEPARTUM: Primary | ICD-10-CM

## 2019-04-08 PROCEDURE — PNV: Performed by: NURSE PRACTITIONER

## 2019-05-06 ENCOUNTER — ROUTINE PRENATAL (OUTPATIENT)
Dept: OBGYN CLINIC | Facility: CLINIC | Age: 29
End: 2019-05-06

## 2019-05-06 VITALS
DIASTOLIC BLOOD PRESSURE: 64 MMHG | BODY MASS INDEX: 29.96 KG/M2 | SYSTOLIC BLOOD PRESSURE: 110 MMHG | WEIGHT: 169.13 LBS

## 2019-05-06 DIAGNOSIS — Z34.80 SUPERVISION OF OTHER NORMAL PREGNANCY, ANTEPARTUM: ICD-10-CM

## 2019-05-06 DIAGNOSIS — Z3A.25 25 WEEKS GESTATION OF PREGNANCY: Primary | ICD-10-CM

## 2019-05-06 PROCEDURE — PNV: Performed by: OBSTETRICS & GYNECOLOGY

## 2019-06-03 ENCOUNTER — ROUTINE PRENATAL (OUTPATIENT)
Dept: OBGYN CLINIC | Facility: CLINIC | Age: 29
End: 2019-06-03
Payer: COMMERCIAL

## 2019-06-03 VITALS — WEIGHT: 171 LBS | SYSTOLIC BLOOD PRESSURE: 120 MMHG | DIASTOLIC BLOOD PRESSURE: 78 MMHG | BODY MASS INDEX: 30.29 KG/M2

## 2019-06-03 DIAGNOSIS — Z23 NEED FOR TDAP VACCINATION: ICD-10-CM

## 2019-06-03 DIAGNOSIS — Z34.83 ENCOUNTER FOR SUPERVISION OF OTHER NORMAL PREGNANCY IN THIRD TRIMESTER: Primary | ICD-10-CM

## 2019-06-03 PROBLEM — Z34.90 SUPERVISION OF NORMAL PREGNANCY: Status: ACTIVE | Noted: 2019-06-03

## 2019-06-03 PROCEDURE — PNV: Performed by: NURSE PRACTITIONER

## 2019-06-03 PROCEDURE — 90715 TDAP VACCINE 7 YRS/> IM: CPT

## 2019-06-03 PROCEDURE — 90471 IMMUNIZATION ADMIN: CPT

## 2019-06-07 ENCOUNTER — TELEPHONE (OUTPATIENT)
Dept: OBGYN CLINIC | Facility: CLINIC | Age: 29
End: 2019-06-07

## 2019-06-07 DIAGNOSIS — R73.09 ABNORMAL GLUCOSE LEVEL: Primary | ICD-10-CM

## 2019-06-13 ENCOUNTER — OFFICE VISIT (OUTPATIENT)
Dept: PERINATAL CARE | Facility: OTHER | Age: 29
End: 2019-06-13
Payer: COMMERCIAL

## 2019-06-13 VITALS
HEIGHT: 63 IN | SYSTOLIC BLOOD PRESSURE: 111 MMHG | HEART RATE: 97 BPM | DIASTOLIC BLOOD PRESSURE: 68 MMHG | WEIGHT: 175.4 LBS | BODY MASS INDEX: 31.08 KG/M2

## 2019-06-13 DIAGNOSIS — R73.09 ABNORMAL GLUCOSE LEVEL: ICD-10-CM

## 2019-06-13 DIAGNOSIS — Z3A.30 30 WEEKS GESTATION OF PREGNANCY: ICD-10-CM

## 2019-06-13 DIAGNOSIS — O24.410 DIET CONTROLLED GESTATIONAL DIABETES MELLITUS (GDM) IN THIRD TRIMESTER: Primary | ICD-10-CM

## 2019-06-13 DIAGNOSIS — Z34.80 SUPERVISION OF OTHER NORMAL PREGNANCY, ANTEPARTUM: ICD-10-CM

## 2019-06-13 PROCEDURE — G0108 DIAB MANAGE TRN  PER INDIV: HCPCS

## 2019-06-13 RX ORDER — BLOOD SUGAR DIAGNOSTIC
STRIP MISCELLANEOUS
Qty: 100 EACH | Refills: 4 | Status: SHIPPED | OUTPATIENT
Start: 2019-06-13 | End: 2019-08-18 | Stop reason: HOSPADM

## 2019-06-17 ENCOUNTER — DOCUMENTATION (OUTPATIENT)
Dept: PERINATAL CARE | Facility: CLINIC | Age: 29
End: 2019-06-17

## 2019-06-17 ENCOUNTER — ROUTINE PRENATAL (OUTPATIENT)
Dept: OBGYN CLINIC | Facility: CLINIC | Age: 29
End: 2019-06-17

## 2019-06-17 ENCOUNTER — OFFICE VISIT (OUTPATIENT)
Dept: PERINATAL CARE | Facility: CLINIC | Age: 29
End: 2019-06-17
Payer: COMMERCIAL

## 2019-06-17 VITALS
DIASTOLIC BLOOD PRESSURE: 70 MMHG | BODY MASS INDEX: 31.55 KG/M2 | SYSTOLIC BLOOD PRESSURE: 122 MMHG | WEIGHT: 178.13 LBS

## 2019-06-17 DIAGNOSIS — Z3A.30 30 WEEKS GESTATION OF PREGNANCY: ICD-10-CM

## 2019-06-17 DIAGNOSIS — O24.414 INSULIN CONTROLLED GESTATIONAL DIABETES MELLITUS (GDM) IN THIRD TRIMESTER: ICD-10-CM

## 2019-06-17 DIAGNOSIS — Z34.80 SUPERVISION OF OTHER NORMAL PREGNANCY, ANTEPARTUM: ICD-10-CM

## 2019-06-17 DIAGNOSIS — O24.410 DIET CONTROLLED GESTATIONAL DIABETES MELLITUS (GDM) IN THIRD TRIMESTER: Primary | ICD-10-CM

## 2019-06-17 DIAGNOSIS — Z34.80 SUPERVISION OF OTHER NORMAL PREGNANCY, ANTEPARTUM: Primary | ICD-10-CM

## 2019-06-17 PROBLEM — Z34.90 SUPERVISION OF NORMAL PREGNANCY: Status: RESOLVED | Noted: 2019-06-03 | Resolved: 2019-06-17

## 2019-06-17 PROBLEM — Z23 NEED FOR TDAP VACCINATION: Status: RESOLVED | Noted: 2019-06-03 | Resolved: 2019-06-17

## 2019-06-17 PROCEDURE — PNV: Performed by: OBSTETRICS & GYNECOLOGY

## 2019-06-17 PROCEDURE — G0108 DIAB MANAGE TRN  PER INDIV: HCPCS

## 2019-06-20 LAB
ALBUMIN SERPL-MCNC: 3.6 G/DL (ref 3.6–5.1)
ALBUMIN/GLOB SERPL: 1.4 (CALC) (ref 1–2.5)
ALP SERPL-CCNC: 91 U/L (ref 33–115)
ALT SERPL-CCNC: 10 U/L (ref 6–29)
AST SERPL-CCNC: 11 U/L (ref 10–30)
BILIRUB SERPL-MCNC: 0.3 MG/DL (ref 0.2–1.2)
BUN SERPL-MCNC: 18 MG/DL (ref 7–25)
BUN/CREAT SERPL: NORMAL (CALC) (ref 6–22)
CALCIUM SERPL-MCNC: 8.6 MG/DL (ref 8.6–10.2)
CHLORIDE SERPL-SCNC: 105 MMOL/L (ref 98–110)
CO2 SERPL-SCNC: 23 MMOL/L (ref 20–32)
CREAT SERPL-MCNC: 0.75 MG/DL (ref 0.5–1.1)
GLOBULIN SER CALC-MCNC: 2.5 G/DL (CALC) (ref 1.9–3.7)
GLUCOSE SERPL-MCNC: 82 MG/DL (ref 65–99)
HBA1C MFR BLD: 5.4 % OF TOTAL HGB
POTASSIUM SERPL-SCNC: 4.5 MMOL/L (ref 3.5–5.3)
PROT SERPL-MCNC: 6.1 G/DL (ref 6.1–8.1)
SL AMB EGFR AFRICAN AMERICAN: 126 ML/MIN/1.73M2
SL AMB EGFR NON AFRICAN AMERICAN: 108 ML/MIN/1.73M2
SODIUM SERPL-SCNC: 137 MMOL/L (ref 135–146)

## 2019-06-21 ENCOUNTER — TELEPHONE (OUTPATIENT)
Dept: PERINATAL CARE | Facility: CLINIC | Age: 29
End: 2019-06-21

## 2019-06-21 ENCOUNTER — DOCUMENTATION (OUTPATIENT)
Dept: PERINATAL CARE | Facility: CLINIC | Age: 29
End: 2019-06-21

## 2019-06-25 ENCOUNTER — OFFICE VISIT (OUTPATIENT)
Dept: PERINATAL CARE | Facility: OTHER | Age: 29
End: 2019-06-25
Payer: COMMERCIAL

## 2019-06-25 VITALS
HEART RATE: 87 BPM | HEIGHT: 63 IN | SYSTOLIC BLOOD PRESSURE: 113 MMHG | WEIGHT: 172.2 LBS | BODY MASS INDEX: 30.51 KG/M2 | DIASTOLIC BLOOD PRESSURE: 72 MMHG

## 2019-06-25 DIAGNOSIS — Z3A.31 PREGNANCY WITH 31 COMPLETED WEEKS GESTATION: ICD-10-CM

## 2019-06-25 DIAGNOSIS — Z34.80 SUPERVISION OF OTHER NORMAL PREGNANCY, ANTEPARTUM: ICD-10-CM

## 2019-06-25 DIAGNOSIS — O24.414 INSULIN CONTROLLED GESTATIONAL DIABETES MELLITUS (GDM) IN THIRD TRIMESTER: Primary | ICD-10-CM

## 2019-06-25 PROCEDURE — G0108 DIAB MANAGE TRN  PER INDIV: HCPCS

## 2019-06-25 RX ORDER — PEN NEEDLE, DIABETIC 30 GX3/16"
NEEDLE, DISPOSABLE MISCELLANEOUS
Qty: 90 EACH | Refills: 1 | Status: SHIPPED | OUTPATIENT
Start: 2019-06-25 | End: 2020-03-09 | Stop reason: ALTCHOICE

## 2019-06-25 RX ORDER — INSULIN DETEMIR 100 [IU]/ML
INJECTION, SOLUTION SUBCUTANEOUS
Qty: 5 PEN | Refills: 0 | Status: SHIPPED | OUTPATIENT
Start: 2019-06-25 | End: 2019-08-18 | Stop reason: HOSPADM

## 2019-07-01 ENCOUNTER — DOCUMENTATION (OUTPATIENT)
Dept: PERINATAL CARE | Facility: CLINIC | Age: 29
End: 2019-07-01

## 2019-07-01 ENCOUNTER — ROUTINE PRENATAL (OUTPATIENT)
Dept: OBGYN CLINIC | Facility: MEDICAL CENTER | Age: 29
End: 2019-07-01

## 2019-07-01 VITALS — SYSTOLIC BLOOD PRESSURE: 106 MMHG | WEIGHT: 177.2 LBS | BODY MASS INDEX: 31.39 KG/M2 | DIASTOLIC BLOOD PRESSURE: 64 MMHG

## 2019-07-01 DIAGNOSIS — O24.414 INSULIN CONTROLLED GESTATIONAL DIABETES MELLITUS (GDM) IN THIRD TRIMESTER: Primary | ICD-10-CM

## 2019-07-01 DIAGNOSIS — Z34.80 SUPERVISION OF OTHER NORMAL PREGNANCY, ANTEPARTUM: ICD-10-CM

## 2019-07-01 PROCEDURE — PNV: Performed by: PHYSICIAN ASSISTANT

## 2019-07-01 NOTE — ASSESSMENT & PLAN NOTE
Better control with insulin but still needs some tweaking  Sees [facility] tomorrow for growth US and NST

## 2019-07-01 NOTE — PROGRESS NOTES
Date:  19  RE: Keo Lee    : 1990  Estimated Date of Delivery: 19  EGA: 32w4d  OB/GYN: Millicent Rivas    Insulin controlled GDM third trimester        Blood sugars copied from patient email  Current regimen:  Levemir 25 units at 11 PM daily  2000 calorie gestational diabetes diet; 3 meals and 3 snacks  SMBG 4 times per day; fasting and 2 hours pp with a Verio OneTouch blood glucose meter  Patient was previously advised to check FBG measurement following no longer than a 8-10 hour fast  Declines Metformin  Plan:  Continue Levemir 25 units at 11 PM daily, be consistent with timing  Continue GDM diet, eating every 2 to 3 5 hours with recommended combination of carbohydrates, protein and fat per meal/snack  No more than 8 to 10 hours of fasting overnight  Stay active if no restriction from your OB, up to 30 minutes a day of walking  US (): Fetal growth and CLAUDIA normal- next US scheduled for   HGA1C (): 5 4%  NST twice a week and CLAUDIA weekly  Date due to report next:  Friday, 19 or sooner if needed       RONDA Bonilla, CDE  Diabetes Educator   Diabetes [facility] Pregnancy Program

## 2019-07-01 NOTE — PROGRESS NOTES
Patient w/o complaints  (+) good fetal movement, denies any bleeding, fluid leakage or ctx    Problem List Items Addressed This Visit        Endocrine    Insulin controlled gestational diabetes mellitus (GDM) in third trimester - Primary     Better control with insulin but still needs some tweaking  Sees [facility] tomorrow for growth US and NST              Other    Supervision of other normal pregnancy, antepartum     RTO 2 wks  Check in card given  Reviewed PTL precautions, fetal kick counts and reasons to call

## 2019-07-02 ENCOUNTER — ULTRASOUND (OUTPATIENT)
Dept: PERINATAL CARE | Facility: OTHER | Age: 29
End: 2019-07-02
Payer: COMMERCIAL

## 2019-07-02 VITALS
HEART RATE: 96 BPM | BODY MASS INDEX: 31.47 KG/M2 | HEIGHT: 63 IN | DIASTOLIC BLOOD PRESSURE: 71 MMHG | WEIGHT: 177.6 LBS | SYSTOLIC BLOOD PRESSURE: 125 MMHG

## 2019-07-02 DIAGNOSIS — O24.414 INSULIN CONTROLLED GESTATIONAL DIABETES MELLITUS (GDM) IN THIRD TRIMESTER: Primary | ICD-10-CM

## 2019-07-02 DIAGNOSIS — Z3A.32 32 WEEKS GESTATION OF PREGNANCY: ICD-10-CM

## 2019-07-02 PROCEDURE — 76816 OB US FOLLOW-UP PER FETUS: CPT | Performed by: OBSTETRICS & GYNECOLOGY

## 2019-07-02 PROCEDURE — 59025 FETAL NON-STRESS TEST: CPT | Performed by: OBSTETRICS & GYNECOLOGY

## 2019-07-02 PROCEDURE — 99212 OFFICE O/P EST SF 10 MIN: CPT | Performed by: OBSTETRICS & GYNECOLOGY

## 2019-07-02 NOTE — LETTER
July 5, 2019     Mariaa Doe MD  [address]    Patient: Stephanie David   YOB: 1990   Date of Visit: 7/2/2019       Dear Dr Dave Lees: Thank you for referring Stephanie David to me for evaluation  Below are my notes for this consultation  If you have questions, please do not hesitate to call me  I look forward to following your patient along with you  Sincerely,        Gamal French MD        CC: No Recipients  Gamal French MD  7/2/2019  2:55 PM  Sign at close encounter  Please refer to the [facility] ultrasound report in Ob Procedures for additional information regarding the visit to the [facility]  today

## 2019-07-02 NOTE — PROGRESS NOTES
NST procedure and expected outcome explained to patient  Daily fetal kick count discussed with handout given  Patient verbalized understanding of all and was receptive      Gerson Badillo RN

## 2019-07-02 NOTE — PROGRESS NOTES
Please refer to the [facility] ultrasound report in Ob Procedures for additional information regarding the visit to the [facility]  today

## 2019-07-05 ENCOUNTER — ROUTINE PRENATAL (OUTPATIENT)
Dept: PERINATAL CARE | Facility: OTHER | Age: 29
End: 2019-07-05
Payer: COMMERCIAL

## 2019-07-05 VITALS
BODY MASS INDEX: 31.4 KG/M2 | DIASTOLIC BLOOD PRESSURE: 73 MMHG | HEIGHT: 63 IN | WEIGHT: 177.2 LBS | HEART RATE: 91 BPM | SYSTOLIC BLOOD PRESSURE: 118 MMHG

## 2019-07-05 DIAGNOSIS — Z34.80 SUPERVISION OF OTHER NORMAL PREGNANCY, ANTEPARTUM: ICD-10-CM

## 2019-07-05 DIAGNOSIS — Z3A.33 33 WEEKS GESTATION OF PREGNANCY: Primary | ICD-10-CM

## 2019-07-05 DIAGNOSIS — O24.414 INSULIN CONTROLLED GESTATIONAL DIABETES MELLITUS (GDM) IN THIRD TRIMESTER: ICD-10-CM

## 2019-07-05 PROCEDURE — 59025 FETAL NON-STRESS TEST: CPT | Performed by: OBSTETRICS & GYNECOLOGY

## 2019-07-09 ENCOUNTER — ULTRASOUND (OUTPATIENT)
Dept: PERINATAL CARE | Facility: OTHER | Age: 29
End: 2019-07-09
Payer: COMMERCIAL

## 2019-07-09 VITALS
BODY MASS INDEX: 31.36 KG/M2 | SYSTOLIC BLOOD PRESSURE: 120 MMHG | HEIGHT: 63 IN | HEART RATE: 101 BPM | WEIGHT: 177 LBS | DIASTOLIC BLOOD PRESSURE: 76 MMHG

## 2019-07-09 DIAGNOSIS — O24.414 INSULIN CONTROLLED GESTATIONAL DIABETES MELLITUS (GDM) IN THIRD TRIMESTER: ICD-10-CM

## 2019-07-09 DIAGNOSIS — Z3A.33 33 WEEKS GESTATION OF PREGNANCY: Primary | ICD-10-CM

## 2019-07-09 DIAGNOSIS — Z34.80 SUPERVISION OF OTHER NORMAL PREGNANCY, ANTEPARTUM: ICD-10-CM

## 2019-07-09 PROCEDURE — 76815 OB US LIMITED FETUS(S): CPT | Performed by: OBSTETRICS & GYNECOLOGY

## 2019-07-09 PROCEDURE — 59025 FETAL NON-STRESS TEST: CPT | Performed by: OBSTETRICS & GYNECOLOGY

## 2019-07-09 NOTE — PROGRESS NOTES
Please refer to the [facility] ultrasound report in Ob Procedures for additional information regarding the visit to the [facility]  today

## 2019-07-09 NOTE — PATIENT INSTRUCTIONS

## 2019-07-12 ENCOUNTER — ROUTINE PRENATAL (OUTPATIENT)
Dept: PERINATAL CARE | Facility: OTHER | Age: 29
End: 2019-07-12
Payer: COMMERCIAL

## 2019-07-12 ENCOUNTER — DOCUMENTATION (OUTPATIENT)
Dept: PERINATAL CARE | Facility: OTHER | Age: 29
End: 2019-07-12

## 2019-07-12 VITALS
WEIGHT: 178.4 LBS | HEIGHT: 63 IN | HEART RATE: 96 BPM | BODY MASS INDEX: 31.61 KG/M2 | SYSTOLIC BLOOD PRESSURE: 111 MMHG | DIASTOLIC BLOOD PRESSURE: 70 MMHG

## 2019-07-12 DIAGNOSIS — Z3A.34 34 WEEKS GESTATION OF PREGNANCY: ICD-10-CM

## 2019-07-12 DIAGNOSIS — O24.414 INSULIN CONTROLLED GESTATIONAL DIABETES MELLITUS (GDM) IN THIRD TRIMESTER: ICD-10-CM

## 2019-07-12 PROCEDURE — 59025 FETAL NON-STRESS TEST: CPT | Performed by: OBSTETRICS & GYNECOLOGY

## 2019-07-12 NOTE — PROGRESS NOTES
Date:  19  RE: Jose Kinney    : 1990  Estimated Date of Delivery: 19  EGA: 34w1d  OB/GYN: Meenakshi Cobb    Insulin controlled GDM third trimester          Blood sugars copied from patient email  Current regimen:  Levemir 25 units at 11 PM daily  2000 calorie gestational diabetes diet; 3 meals and 3 snacks  SMBG 4 times per day; fasting and 2 hours pp with a Verio OneTouch blood glucose meter  Patient was previously advised to check FBG measurement following no longer than a 8-10 hour fast  Declines Metformin  Plan:  Increase Levemir from 25 to 27 units at 11 PM daily, be consistent with timing  Continue GDM diet, eating every 2 to 3 5 hours with recommended combination of carbohydrates, protein and fat per meal/snack  Advised when repeating a similar meal that caused a 2 hr pp >119 mg/dl to decrease 1 carbohydrate and increase 1 protein serving  No more than 8 to 10 hours of fasting overnight  Stay active if no restriction from your OB, up to 30 minutes a day of walking  US (): Fetal growth and CLAUDIA normal- next US scheduled for   HGA1C (): 5 4%  NST twice a week and CLAUDIA weekly  Date due to report next:  Tuesday, 19 or sooner if needed       Medhat Gonzalez, RD,LDN,CDE  Diabetes Educator   Diabetes and Pregnancy Program

## 2019-07-12 NOTE — PROGRESS NOTES
[address]: Ms Sandra Leija was seen today at 34w1d for NST (found under the pregnancy episode) which I reviewed the RN assessment and agree  Please don't hesitate to contact our office with any concerns or questions    Keli Horton MD

## 2019-07-12 NOTE — PATIENT INSTRUCTIONS
Thank you for choosing [address] [place] [address] for your  care today  If you have any questions about your ultrasound or care, please do not hesitate to contact us or your primary obstetrician

## 2019-07-15 ENCOUNTER — ROUTINE PRENATAL (OUTPATIENT)
Dept: OBGYN CLINIC | Facility: CLINIC | Age: 29
End: 2019-07-15

## 2019-07-15 VITALS — WEIGHT: 179 LBS | BODY MASS INDEX: 31.71 KG/M2 | SYSTOLIC BLOOD PRESSURE: 118 MMHG | DIASTOLIC BLOOD PRESSURE: 60 MMHG

## 2019-07-15 DIAGNOSIS — O24.414 INSULIN CONTROLLED GESTATIONAL DIABETES MELLITUS (GDM) IN THIRD TRIMESTER: ICD-10-CM

## 2019-07-15 DIAGNOSIS — Z34.83 ENCOUNTER FOR SUPERVISION OF OTHER NORMAL PREGNANCY IN THIRD TRIMESTER: Primary | ICD-10-CM

## 2019-07-15 PROCEDURE — PNV: Performed by: NURSE PRACTITIONER

## 2019-07-15 NOTE — PATIENT INSTRUCTIONS
Pregnancy at 28 to 38 Weeks   AMBULATORY CARE:   What changes are happening to your body: You are considered full term at the beginning of 37 weeks  Your breathing may be easier if your baby has moved down into a head-down position  You may need to urinate more often because the baby may be pressing on your bladder  You may also feel more discomfort and get tired easily  Seek care immediately if:   · You develop a severe headache that does not go away  · You have new or increased vision changes, such as blurred or spotted vision  · You have new or increased swelling in your face or hands  · You have vaginal spotting or bleeding  · Your water broke or you feel warm water gushing or trickling from your vagina  Contact your healthcare provider if:   · You have more than 5 contractions in 1 hour  · You notice any changes in your baby's movements  · You have abdominal cramps, pressure, or tightening  · You have a change in vaginal discharge  · You have chills or a fever  · You have vaginal itching, burning, or pain  · You have yellow, green, white, or foul-smelling vaginal discharge  · You have pain or burning when you urinate, less urine than usual, or pink or bloody urine  · You have questions or concerns about your condition or care  How to care for yourself at this stage of your pregnancy:   · Eat a variety of healthy foods  Healthy foods include fruits, vegetables, whole-grain breads, low-fat dairy foods, beans, lean meats, and fish  Drink liquids as directed  Ask how much liquid to drink each day and which liquids are best for you  Limit caffeine to less than 200 milligrams each day  Limit your intake of fish to 2 servings each week  Choose fish low in mercury such as canned light tuna, shrimp, crab, salmon, cod, or tilapia  Do not  eat fish high in mercury such as swordfish, tilefish, anna mackerel, and shark  · Take prenatal vitamins as directed    Your need for certain vitamins and minerals, such as folic acid, increases during pregnancy  Prenatal vitamins provide some of the extra vitamins and minerals you need  Prenatal vitamins may also help to decrease the risk of certain birth defects  · Rest as needed  Put your feet up if you have swelling in your ankles and feet  · Do not smoke  If you smoke, it is never too late to quit  Smoking increases your risk of a miscarriage and other health problems during your pregnancy  Smoking can cause your baby to be born too early or weigh less at birth  Ask your healthcare provider for information if you need help quitting  · Do not drink alcohol  Alcohol passes from your body to your baby through the placenta  It can affect your baby's brain development and cause fetal alcohol syndrome (FAS)  FAS is a group of conditions that causes mental, behavior, and growth problems  · Talk to your healthcare provider before you take any medicines  Many medicines may harm your baby if you take them when you are pregnant  Do not take any medicines, vitamins, herbs, or supplements without first talking to your healthcare provider  Never use illegal or street drugs (such as marijuana or cocaine) while you are pregnant  · Talk to your healthcare provider before you travel  You may not be able to travel in an airplane after 36 weeks  He may also recommend that you avoid long road trips  Safety tips during pregnancy:   · Avoid hot tubs and saunas  Do not use a hot tub or sauna while you are pregnant, especially during your first trimester  Hot tubs and saunas may raise your baby's temperature and increase the risk of birth defects  · Avoid toxoplasmosis  This is an infection caused by eating raw meat or being around infected cat feces  It can cause birth defects, miscarriages, and other problems  Wash your hands after you touch raw meat  Make sure any meat is well-cooked before you eat it   Avoid raw eggs and unpasteurized milk  Use gloves or ask someone else to clean your cat's litter box while you are pregnant  · Ask your healthcare provider about travel  The most comfortable time to travel is during the second trimester  Ask your healthcare provider if you can travel after 36 weeks  You may not be able to travel in an airplane after 36 weeks  He may also recommend that you avoid long road trips  Changes that are happening with your baby:  By 38 weeks, your baby may weigh between 6 and 9 pounds  Your baby may be about 14 inches long from the top of the head to the rump (baby's bottom)  Your baby hears well enough to know your voice  As your baby gets larger, you may feel fewer kicks and more stretching and rolling  Your baby may move into a head-down position  Your baby will also rest lower in your abdomen  What you need to know about prenatal care: Your healthcare provider will check your blood pressure and weight  You may also need the following:  · A urine test  may also be done to check for sugar and protein  These can be signs of gestational diabetes or infection  Protein in your urine may also be a sign of preeclampsia  Preeclampsia is a condition that can develop during week 20 or later of your pregnancy  It causes high blood pressure, and it can cause problems with your kidneys and other organs  · A blood test  may be done to check for anemia (low iron level)  · A Tdap vaccine  may be recommended by your healthcare provider  · A group B strep test  is a test that is done to check for group B strep infection  Group B strep is a type of bacteria that may be found in the vagina or rectum  It can be passed to your baby during delivery if you have it  Your healthcare provider will take swab your vagina or rectum and send the sample to the lab for tests  · Fundal height  is a measurement of your uterus to check your baby's growth   This number is usually the same as the number of weeks that you have been pregnant  Your healthcare provider may also check your baby's position  · Your baby's heart rate  will be checked  © 2017 [address] Information is for End User's use only and may not be sold, redistributed or otherwise used for commercial purposes  All illustrations and images included in CareNotes® are the copyrighted property of [employer]  or Billy Watkins  The above information is an  only  It is not intended as medical advice for individual conditions or treatments  Talk to your doctor, nurse or pharmacist before following any medical regimen to see if it is safe and effective for you

## 2019-07-15 NOTE — PROGRESS NOTES
Problem List Items Addressed This Visit        Endocrine    Insulin controlled gestational diabetes mellitus (GDM) in third trimester      Other Visit Diagnoses     Encounter for supervision of other normal pregnancy in third trimester    -  Primary        Feels well  Here with FOB and son  Reports good fetal movement  Blood sugars wnl for the most part but will increase her levemir to 27u per recommendation from DP  Perinatology appt scheduled for CLAUDIA and NSTs  Given slip again for antibody screen  Denies LOF/CTX/VB  No additional concerns  Advised to increase fluids on the warmer days

## 2019-07-16 ENCOUNTER — ULTRASOUND (OUTPATIENT)
Dept: PERINATAL CARE | Facility: OTHER | Age: 29
End: 2019-07-16
Payer: COMMERCIAL

## 2019-07-16 VITALS
DIASTOLIC BLOOD PRESSURE: 79 MMHG | BODY MASS INDEX: 31.71 KG/M2 | WEIGHT: 179 LBS | HEART RATE: 90 BPM | SYSTOLIC BLOOD PRESSURE: 122 MMHG | HEIGHT: 63 IN

## 2019-07-16 DIAGNOSIS — Z3A.34 34 WEEKS GESTATION OF PREGNANCY: Primary | ICD-10-CM

## 2019-07-16 DIAGNOSIS — Z34.80 SUPERVISION OF OTHER NORMAL PREGNANCY, ANTEPARTUM: ICD-10-CM

## 2019-07-16 DIAGNOSIS — O24.414 INSULIN CONTROLLED GESTATIONAL DIABETES MELLITUS (GDM) IN THIRD TRIMESTER: ICD-10-CM

## 2019-07-16 PROCEDURE — 59025 FETAL NON-STRESS TEST: CPT | Performed by: OBSTETRICS & GYNECOLOGY

## 2019-07-16 PROCEDURE — 76815 OB US LIMITED FETUS(S): CPT | Performed by: OBSTETRICS & GYNECOLOGY

## 2019-07-16 NOTE — PROGRESS NOTES
Please refer to the [facility] ultrasound report in Ob Procedures for additional information regarding the visit to the [facility]  today

## 2019-07-17 ENCOUNTER — DOCUMENTATION (OUTPATIENT)
Dept: PERINATAL CARE | Facility: CLINIC | Age: 29
End: 2019-07-17

## 2019-07-17 NOTE — PROGRESS NOTES
Date:  19  RE: Hernando Willis    : 1990  Estimated Date of Delivery: 19  EGA: 34w6d  OB/GYN: Eyad Jha  Insulin controlled GDM third trimester            Blood sugars copied from patient email  Current regimen:  Levemir 27 units at 11 PM daily  2000 calorie gestational diabetes diet; 3 meals and 3 snacks  SMBG 4 times per day; fasting and 2 hours pp with a Verio OneTouch blood glucose meter  Patient was previously advised to check FBG measurement following no longer than a 8-10 hour fast  Declines Metformin  Plan:  Increase Levemir from 25 to 27 units at 11 PM daily, be consistent with timing  Continue GDM diet, eating every 2 to 3 5 hours with recommended combination of carbohydrates, protein and fat per meal/snack  Advised when repeating a similar meal that caused a 2 hr pp >119 mg/dl to decrease 1 carbohydrate and increase 1 protein serving  No more than 8 to 10 hours of fasting overnight  Stay active if no restriction from your OB, up to 30 minutes a day of walking  US (19): Fetal growth and CLAUDIA normal   HGA1C (): 5 4%; re-order week of 19  NST twice a week and CLAUDIA weekly  Date due to report next:  Tuesday, 19 or sooner if needed       Magy Duenas RD,LDN,CDE  Diabetes Educator   Diabetes and Pregnancy Program

## 2019-07-19 ENCOUNTER — ROUTINE PRENATAL (OUTPATIENT)
Dept: PERINATAL CARE | Facility: OTHER | Age: 29
End: 2019-07-19
Payer: COMMERCIAL

## 2019-07-19 VITALS
SYSTOLIC BLOOD PRESSURE: 107 MMHG | HEART RATE: 91 BPM | WEIGHT: 178.4 LBS | DIASTOLIC BLOOD PRESSURE: 72 MMHG | BODY MASS INDEX: 31.61 KG/M2 | HEIGHT: 63 IN

## 2019-07-19 DIAGNOSIS — O24.414 INSULIN CONTROLLED GESTATIONAL DIABETES MELLITUS (GDM) IN THIRD TRIMESTER: Primary | ICD-10-CM

## 2019-07-19 PROCEDURE — 59025 FETAL NON-STRESS TEST: CPT | Performed by: OBSTETRICS & GYNECOLOGY

## 2019-07-20 NOTE — PROGRESS NOTES
[address]: Ms June Blunt was seen at 35w1d for NST (found under the pregnancy episode) which I reviewed the RN assessment and agree  Please don't hesitate to contact our office with any concerns or questions    Kiah Dennis MD

## 2019-07-23 ENCOUNTER — ULTRASOUND (OUTPATIENT)
Dept: PERINATAL CARE | Facility: OTHER | Age: 29
End: 2019-07-23
Payer: COMMERCIAL

## 2019-07-23 VITALS
DIASTOLIC BLOOD PRESSURE: 66 MMHG | WEIGHT: 178.8 LBS | SYSTOLIC BLOOD PRESSURE: 102 MMHG | HEART RATE: 88 BPM | HEIGHT: 63 IN | BODY MASS INDEX: 31.68 KG/M2

## 2019-07-23 DIAGNOSIS — O24.414 INSULIN CONTROLLED GESTATIONAL DIABETES MELLITUS (GDM) IN THIRD TRIMESTER: ICD-10-CM

## 2019-07-23 DIAGNOSIS — Z3A.35 35 WEEKS GESTATION OF PREGNANCY: Primary | ICD-10-CM

## 2019-07-23 DIAGNOSIS — Z34.80 SUPERVISION OF OTHER NORMAL PREGNANCY, ANTEPARTUM: ICD-10-CM

## 2019-07-23 PROCEDURE — 59025 FETAL NON-STRESS TEST: CPT | Performed by: OBSTETRICS & GYNECOLOGY

## 2019-07-23 PROCEDURE — 76815 OB US LIMITED FETUS(S): CPT | Performed by: OBSTETRICS & GYNECOLOGY

## 2019-07-23 NOTE — PROGRESS NOTES
CLAUDIA  and NST at 35 5/7 weeks  Indication: GDM  Reactive NST  normal CLAUDIA at 13 1      Plan:  2 x wk NST    Rakel Ibrahim MD

## 2019-07-25 ENCOUNTER — DOCUMENTATION (OUTPATIENT)
Dept: PERINATAL CARE | Facility: OTHER | Age: 29
End: 2019-07-25

## 2019-07-25 NOTE — PROGRESS NOTES
Date:  19  RE: Claudia Oven    : 1990  Estimated Date of Delivery: 19  EGA: 36w0d  OB/GYN: Jenn Valiente  Insulin controlled GDM third trimester         Blood sugars copied from patient email  Spoke with patient by phone to verify current insulin dosage  Patient also reported injection site reaction of welts that are itchy  She is applying hydrocortisone creme which has helped  Patient also reported an air bubble that may have disrupted insulin dosage on  resulting in a higher FBG  Elevated 2 hr pp were a result of dining out at fast food restaurants per patient  19 FBG- 80 mg/dl     Current regimen:  Levemir 27 units at 11 PM daily  (Verified current dosage)  2000 calorie gestational diabetes diet; 3 meals and 3 snacks  SMBG 4 times per day; fasting and 2 hours pp with a Verio OneTouch blood glucose meter  Patient was previously advised to check FBG measurement following no longer than a 8-10 hour fast  Declines Metformin  Plan:  Continue Levemir 27 units at 11 PM daily, be consistent with timing  Reviewed insulin injection procedure with patient  To decrease injection site reaction potential patient may benefit from 2 equal injections if Levemir requires increase >30 mg/dl on next report  Continue GDM diet, eating every 2 to 3 5 hours with recommended combination of carbohydrates, protein and fat per meal/snack  Advised when repeating a similar meal that caused a 2 hr pp >119 mg/dl to decrease 1 carbohydrate and increase 1 protein serving  No more than 8 to 10 hours of fasting overnight  Advised patient to avoid high fat meals at fast food restaurants  Stay active if no restriction from your OB, up to 30 minutes a day of walking  US (19): Fetal growth and CLAUDIA normal Next growth scan is scheduled for   HGA1C (): 5 4%; re-order week of 19  NST twice a week and CLAUDIA weekly  Date due to report next:  Monday, 19 or sooner if needed       Stephanie Monterroso, RD,LDN,CDE  Diabetes Educator   Diabetes and Pregnancy Program

## 2019-07-26 ENCOUNTER — ROUTINE PRENATAL (OUTPATIENT)
Dept: PERINATAL CARE | Facility: OTHER | Age: 29
End: 2019-07-26
Payer: COMMERCIAL

## 2019-07-26 ENCOUNTER — TELEPHONE (OUTPATIENT)
Dept: PERINATAL CARE | Facility: CLINIC | Age: 29
End: 2019-07-26

## 2019-07-26 ENCOUNTER — DOCUMENTATION (OUTPATIENT)
Dept: PERINATAL CARE | Facility: CLINIC | Age: 29
End: 2019-07-26

## 2019-07-26 VITALS
BODY MASS INDEX: 31.54 KG/M2 | WEIGHT: 178 LBS | HEART RATE: 96 BPM | HEIGHT: 63 IN | SYSTOLIC BLOOD PRESSURE: 113 MMHG | DIASTOLIC BLOOD PRESSURE: 76 MMHG

## 2019-07-26 DIAGNOSIS — Z34.80 SUPERVISION OF OTHER NORMAL PREGNANCY, ANTEPARTUM: ICD-10-CM

## 2019-07-26 DIAGNOSIS — Z3A.36 36 WEEKS GESTATION OF PREGNANCY: ICD-10-CM

## 2019-07-26 DIAGNOSIS — O24.414 INSULIN CONTROLLED GESTATIONAL DIABETES MELLITUS (GDM) IN THIRD TRIMESTER: Primary | ICD-10-CM

## 2019-07-26 PROCEDURE — 59025 FETAL NON-STRESS TEST: CPT | Performed by: OBSTETRICS & GYNECOLOGY

## 2019-07-26 NOTE — PROGRESS NOTES
Date:  19  RE: Yasmin Blackwood    : 1990  Estimated Date of Delivery: 19  EGA: 36w1d  OB/GYN: Flash Motley  Insulin controlled GDM third trimester         Blood sugars copied from patient email  Spoke with patient by phone to verify current insulin dosage  Patient also reported injection site reaction of welts that are itchy  She is applying hydrocortisone creme which has helped  Patient also reported an air bubble that may have disrupted insulin dosage on  resulting in a higher FBG  Elevated 2 hr pp were a result of dining out at fast food restaurants per patient  19 FBG- 80 mg/dl   Addendum: Patient email image of insulin pen with air bubble    Current regimen:  Levemir 27 units at 11 PM daily  (Verified current dosage)  2000 calorie gestational diabetes diet; 3 meals and 3 snacks  SMBG 4 times per day; fasting and 2 hours pp with a Verio OneTouch blood glucose meter  Patient was previously advised to check FBG measurement following no longer than a 8-10 hour fast  Declines Metformin  Plan:  Continue Levemir 27 units at 11 PM daily, be consistent with timing  Reviewed insulin injection procedure with patient  To decrease injection site reaction potential patient may benefit from 2 equal injections if Levemir requires increase >30 mg/dl on next report  Continue GDM diet, eating every 2 to 3 5 hours with recommended combination of carbohydrates, protein and fat per meal/snack  Advised when repeating a similar meal that caused a 2 hr pp >119 mg/dl to decrease 1 carbohydrate and increase 1 protein serving  No more than 8 to 10 hours of fasting overnight  Advised patient to avoid high fat meals at fast food restaurants  Stay active if no restriction from your OB, up to 30 minutes a day of walking  US (19): Fetal growth and CLAUDIA normal Next growth scan is scheduled for   HGA1C (): 5 4%; re-order week of 19  NST twice a week and CLAUDIA weekly       Date due to report next:  Monday, 7/29/19 follow up appointment to discuss air bubbles in insulin pen as well as reported injection site reaction       Tyrell Rodriguez RD,LDN,CDE  Diabetes Educator   [facility]

## 2019-07-26 NOTE — PROGRESS NOTES
I reviewed labor precautions with Donna Frankel which she verbalized understandings and was receptive

## 2019-07-29 ENCOUNTER — OFFICE VISIT (OUTPATIENT)
Dept: PERINATAL CARE | Facility: CLINIC | Age: 29
End: 2019-07-29
Payer: COMMERCIAL

## 2019-07-29 VITALS
SYSTOLIC BLOOD PRESSURE: 99 MMHG | HEART RATE: 87 BPM | HEIGHT: 63 IN | WEIGHT: 178.2 LBS | DIASTOLIC BLOOD PRESSURE: 64 MMHG | BODY MASS INDEX: 31.57 KG/M2

## 2019-07-29 DIAGNOSIS — O24.414 INSULIN CONTROLLED GESTATIONAL DIABETES MELLITUS (GDM) IN THIRD TRIMESTER: ICD-10-CM

## 2019-07-29 DIAGNOSIS — Z3A.36 36 WEEKS GESTATION OF PREGNANCY: ICD-10-CM

## 2019-07-29 PROCEDURE — 99213 OFFICE O/P EST LOW 20 MIN: CPT | Performed by: NURSE PRACTITIONER

## 2019-07-29 NOTE — PATIENT INSTRUCTIONS
1  Continue Levemir 27 units at 1130 PM daily  2  Continue GDM diet with 3 meals and 3 snacks including recommended combination of carb, protein and fat per meal/snack  3  No more than 8 to 10 fasting overnight  4  Continue self monitoring blood glucose fasting and 2 hours after start of each meal  Keep glucose log  5  Report on Monday, 8/5/19 or sooner if needed  6  Continue prenatal vitamin and follow-up with your OB  7  Keep fetal growth ultrasound ultrasound as scheduled  8  NST twice a week and CLAUDIA weekly  9  Always have glucose to treat hypoglycemia, use 15 by 15 rule, refer to patient education sheet    10  Keep in close contact with diabetes education team

## 2019-07-29 NOTE — PROGRESS NOTES
Steph here for follow-up GDM and evaluation of bubble in Levemir syringe  Currently on Levemir 27 units at 1130 PM daily  Eating 3 meals and 3 snacks but reports challenges with eating snacks, substitutes snack with 9 carb juice box  Reports issue with regular milk increasing glucose readings  Discouraged using juice as a snack and recommend 15 grams of carbohydrate with 2 to 3 servings of protein  Self monitoring blood glucose 4 times a day  One touch glucose meter downloaded during office visit  Encouraged Steph to demonstrate current insulin pen technique and re-education provided during visit  Air bubble in syringe secondary to not holding insulin pen in an upright position during sean dose  Returned demonstration provided without difficulty  Also reports site reaction to insulin and using anti-itch topical cream, denies generalized reaction to insulin  Currently prefers to inject self in outer thigh area, refuses to use belly  Recommended  could inject back of arm but prefers to continue with thigh area for now  Positive fetal movement  Vitals:    07/29/19 1037   BP: 99/64   Pulse: 87   Ht  5'3", wt  178 lbs  Refer to attached file for glucose readings  No Known Allergies   Current Outpatient Medications on File Prior to Visit   Medication Sig Dispense Refill    calcium carbonate (TUMS) 500 mg chewable tablet Chew 4 tablets 2 (two) times a day       Insulin Pen Needle (PEN NEEDLES) 32G X 4 MM MISC Use a new needle for each injection at bedtime 90 each 1    LEVEMIR FLEXTOUCH 100 units/mL injection pen 25 units at bedtime, to be titrated (Patient taking differently: 27 units at bedtime, to be titrated) 5 pen 0    ONETOUCH DELICA LANCETS FINE MISC Test 4 times per day  100 each 4    ONETOUCH VERIO test strip Test 4 times per day   100 each 4    Prenatal Vit-Fe Fumarate-FA (PRENATAL VITAMIN PO) Take 1 tablet by mouth daily       No current facility-administered medications on file prior to visit       1  Insulin controlled gestational diabetes mellitus (GDM) in third trimester     2  36 weeks gestation of pregnancy       1  Continue Levemir 27 units at 1130 PM daily  2  Continue GDM diet with 3 meals and 3 snacks including recommended combination of carb, protein and fat per meal/snack  3  No more than 8 to 10 fasting overnight  4  Continue self monitoring blood glucose fasting and 2 hours after start of each meal  Keep glucose log  5  Report on Monday, 8/5/19 or sooner if needed  6  Continue prenatal vitamin and follow-up with your OB  7  Keep fetal growth ultrasound ultrasound as scheduled  8  NST twice a week and CLAUDIA weekly  9  Always have glucose to treat hypoglycemia, use 15 by 15 rule, refer to patient education sheet    10  Keep in close contact with diabetes education team

## 2019-07-30 ENCOUNTER — ULTRASOUND (OUTPATIENT)
Dept: PERINATAL CARE | Facility: OTHER | Age: 29
End: 2019-07-30
Payer: COMMERCIAL

## 2019-07-30 ENCOUNTER — APPOINTMENT (OUTPATIENT)
Dept: PERINATAL CARE | Facility: OTHER | Age: 29
End: 2019-07-30
Payer: COMMERCIAL

## 2019-07-30 VITALS
SYSTOLIC BLOOD PRESSURE: 118 MMHG | HEART RATE: 92 BPM | HEIGHT: 63 IN | WEIGHT: 178.6 LBS | BODY MASS INDEX: 31.64 KG/M2 | DIASTOLIC BLOOD PRESSURE: 75 MMHG

## 2019-07-30 DIAGNOSIS — Z34.80 SUPERVISION OF OTHER NORMAL PREGNANCY, ANTEPARTUM: ICD-10-CM

## 2019-07-30 DIAGNOSIS — O24.414 INSULIN CONTROLLED GESTATIONAL DIABETES MELLITUS (GDM) IN THIRD TRIMESTER: ICD-10-CM

## 2019-07-30 DIAGNOSIS — Z3A.35 35 WEEKS GESTATION OF PREGNANCY: ICD-10-CM

## 2019-07-30 DIAGNOSIS — Z3A.36 36 WEEKS GESTATION OF PREGNANCY: ICD-10-CM

## 2019-07-30 PROCEDURE — 76816 OB US FOLLOW-UP PER FETUS: CPT | Performed by: OBSTETRICS & GYNECOLOGY

## 2019-07-30 PROCEDURE — 59025 FETAL NON-STRESS TEST: CPT | Performed by: OBSTETRICS & GYNECOLOGY

## 2019-07-30 NOTE — LETTER
July 30, 2019     Christi Mata, DO  [address] [facility] [address]    Patient: Adolfo Medrano   YOB: 1990   Date of Visit: 7/30/2019       Dear Dr Aramis Durbin: Thank you for referring Adolfo Medrano to me for evaluation  Below are my notes for this consultation  If you have questions, please do not hesitate to call me  I look forward to following your patient along with you  Sincerely,        Alexander Lew MD        CC: No Recipients  Alexander Lew MD  7/30/2019  2:36 PM  Sign at close encounter  Please refer to the [facility] ultrasound report in Ob Procedures for additional information regarding the visit to the [facility]  today

## 2019-07-30 NOTE — PROGRESS NOTES
Please refer to the [facility] ultrasound report in Ob Procedures for additional information regarding the visit to the [facility]  today

## 2019-07-31 ENCOUNTER — ROUTINE PRENATAL (OUTPATIENT)
Dept: OBGYN CLINIC | Facility: CLINIC | Age: 29
End: 2019-07-31

## 2019-07-31 VITALS — BODY MASS INDEX: 31.53 KG/M2 | WEIGHT: 178 LBS | SYSTOLIC BLOOD PRESSURE: 118 MMHG | DIASTOLIC BLOOD PRESSURE: 70 MMHG

## 2019-07-31 DIAGNOSIS — O24.414 INSULIN CONTROLLED GESTATIONAL DIABETES MELLITUS (GDM) IN THIRD TRIMESTER: ICD-10-CM

## 2019-07-31 DIAGNOSIS — Z3A.36 36 WEEKS GESTATION OF PREGNANCY: ICD-10-CM

## 2019-07-31 DIAGNOSIS — Z34.83 ENCOUNTER FOR SUPERVISION OF OTHER NORMAL PREGNANCY IN THIRD TRIMESTER: Primary | ICD-10-CM

## 2019-07-31 PROBLEM — Z34.90 SUPERVISION OF NORMAL PREGNANCY: Status: ACTIVE | Noted: 2019-07-31

## 2019-07-31 PROCEDURE — 87653 STREP B DNA AMP PROBE: CPT | Performed by: NURSE PRACTITIONER

## 2019-07-31 PROCEDURE — PNV: Performed by: NURSE PRACTITIONER

## 2019-07-31 NOTE — PROGRESS NOTES
Problem List Items Addressed This Visit        Endocrine    Insulin controlled gestational diabetes mellitus (GDM) in third trimester       Other    36 weeks gestation of pregnancy    Supervision of normal pregnancy - Primary        Feels well  Denies LOF/CTX/VB  No concerns  Discussed fetal kick counting  Encouraged to START with her vaginal massages, just can't find the time  GBS today  Sugars well controlled on 27 u of Levemir  IOL at 39/0 up to 39/6 weeks per MFM  CLAUDIA/NST biweekly

## 2019-08-02 ENCOUNTER — ROUTINE PRENATAL (OUTPATIENT)
Dept: PERINATAL CARE | Facility: OTHER | Age: 29
End: 2019-08-02
Payer: COMMERCIAL

## 2019-08-02 VITALS
DIASTOLIC BLOOD PRESSURE: 82 MMHG | WEIGHT: 176 LBS | HEIGHT: 63 IN | SYSTOLIC BLOOD PRESSURE: 126 MMHG | BODY MASS INDEX: 31.18 KG/M2

## 2019-08-02 DIAGNOSIS — O24.414 INSULIN CONTROLLED GESTATIONAL DIABETES MELLITUS (GDM) IN THIRD TRIMESTER: ICD-10-CM

## 2019-08-02 DIAGNOSIS — Z3A.37 37 WEEKS GESTATION OF PREGNANCY: ICD-10-CM

## 2019-08-02 DIAGNOSIS — Z34.80 SUPERVISION OF OTHER NORMAL PREGNANCY, ANTEPARTUM: ICD-10-CM

## 2019-08-02 LAB — GP B STREP DNA SPEC QL NAA+PROBE: NORMAL

## 2019-08-02 PROCEDURE — 59025 FETAL NON-STRESS TEST: CPT | Performed by: OBSTETRICS & GYNECOLOGY

## 2019-08-05 ENCOUNTER — ROUTINE PRENATAL (OUTPATIENT)
Dept: OBGYN CLINIC | Facility: CLINIC | Age: 29
End: 2019-08-05

## 2019-08-05 ENCOUNTER — DOCUMENTATION (OUTPATIENT)
Dept: PERINATAL CARE | Facility: OTHER | Age: 29
End: 2019-08-05

## 2019-08-05 VITALS — SYSTOLIC BLOOD PRESSURE: 122 MMHG | BODY MASS INDEX: 31.53 KG/M2 | WEIGHT: 178 LBS | DIASTOLIC BLOOD PRESSURE: 68 MMHG

## 2019-08-05 DIAGNOSIS — O24.414 INSULIN CONTROLLED GESTATIONAL DIABETES MELLITUS (GDM) IN THIRD TRIMESTER: ICD-10-CM

## 2019-08-05 DIAGNOSIS — Z34.80 SUPERVISION OF OTHER NORMAL PREGNANCY, ANTEPARTUM: Primary | ICD-10-CM

## 2019-08-05 DIAGNOSIS — Z3A.37 37 WEEKS GESTATION OF PREGNANCY: ICD-10-CM

## 2019-08-05 DIAGNOSIS — O24.414 INSULIN CONTROLLED GESTATIONAL DIABETES MELLITUS (GDM) IN THIRD TRIMESTER: Primary | ICD-10-CM

## 2019-08-05 PROBLEM — Z34.90 SUPERVISION OF NORMAL PREGNANCY: Status: RESOLVED | Noted: 2019-07-31 | Resolved: 2019-08-05

## 2019-08-05 PROCEDURE — PNV: Performed by: OBSTETRICS & GYNECOLOGY

## 2019-08-05 NOTE — PROGRESS NOTES
Date:  19  RE: Marisel Riggins    : 1990  Estimated Date of Delivery: 19  EGA: 37w4d  OB/GYN: Jackson Code  Insulin controlled GDM third trimester       Blood sugars copied from patient email  Current regimen:  Levemir 27 units at 11 PM daily  (Verified current dosage)  2000 calorie gestational diabetes diet; 3 meals and 3 snacks  SMBG 4 times per day; fasting and 2 hours pp with a Verio OneTouch blood glucose meter  Patient was previously advised to check FBG measurement following no longer than a 8-10 hour fast  Declines Metformin  Plan:  Fasting values at or above 90 mg/dL  Levemir- increase from 27 units to 30 units at 11 PM daily, split dose into 2 separate injections of 15 one right after the other in 2 separate injection sites for best absorption  Continue GDM diet, eating every 2 to 3 5 hours with recommended combination of carbohydrates, protein and fat per meal/snack  Advised when repeating a similar meal that caused a 2 hr pp >119 mg/dl to decrease 1 carbohydrate and increase 1 protein serving  No more than 8 to 10 hours of fasting overnight  Advised patient to avoid high fat meals at fast food restaurants  Stay active if no restriction from your OB, up to 30 minutes a day of walking  US (19): Fetal growth and CLAUDIA normal    HGA1C (): 5 4%; re-ordered and prescription sent via email  NST twice a week and CLAUDIA weekly       Date due to report next:  Thursday,      Summer Miller RD,LDN  Diabetes Educator   Diabetes and Pregnancy Program

## 2019-08-05 NOTE — PATIENT INSTRUCTIONS
Pregnancy at [address] to [address]:   What changes are happening in my body? You are considered full term at the beginning of 37 weeks  Your breathing may be easier if your baby has moved down into a head-down position  You may need to urinate more often because the baby may be pressing on your bladder  You may also feel more discomfort and get tired easily  How do I care for myself at this stage of my pregnancy? · Eat a variety of healthy foods  Healthy foods include fruits, vegetables, whole-grain breads, low-fat dairy foods, beans, lean meats, and fish  Drink liquids as directed  Ask how much liquid to drink each day and which liquids are best for you  Limit caffeine to less than 200 milligrams each day  Limit your intake of fish to 2 servings each week  Choose fish low in mercury such as canned light tuna, shrimp, salmon, cod, or tilapia  Do not  eat fish high in mercury such as swordfish, tilefish, anna mackerel, and shark  · Take prenatal vitamins as directed  Your need for certain vitamins and minerals, such as folic acid, increases during pregnancy  Prenatal vitamins provide some of the extra vitamins and minerals you need  Prenatal vitamins may also help to decrease the risk of certain birth defects  · Rest as needed  Put your feet up if you have swelling in your ankles and feet  · Do not smoke  If you smoke, it is never too late to quit  Smoking increases your risk of a miscarriage and other health problems during your pregnancy  Smoking can cause your baby to be born too early or weigh less at birth  Ask your healthcare provider for information if you need help quitting  · Do not drink alcohol  Alcohol passes from your body to your baby through the placenta  It can affect your baby's brain development and cause fetal alcohol syndrome (FAS)  FAS is a group of conditions that causes mental, behavior, and growth problems       · Talk to your healthcare provider before you take any medicines  Many medicines may harm your baby if you take them when you are pregnant  Do not take any medicines, vitamins, herbs, or supplements without first talking to your healthcare provider  Never use illegal or street drugs (such as marijuana or cocaine) while you are pregnant  · Talk to your healthcare provider before you travel  You may not be able to travel in an airplane after 36 weeks  He may also recommend that you avoid long road trips  What are some safety tips during pregnancy? · Avoid hot tubs and saunas  Do not use a hot tub or sauna while you are pregnant, especially during your first trimester  Hot tubs and saunas may raise your baby's temperature and increase the risk of birth defects  · Avoid toxoplasmosis  This is an infection caused by eating raw meat or being around infected cat feces  It can cause birth defects, miscarriages, and other problems  Wash your hands after you touch raw meat  Make sure any meat is well-cooked before you eat it  Avoid raw eggs and unpasteurized milk  Use gloves or ask someone else to clean your cat's litter box while you are pregnant  · Ask your healthcare provider about travel  The most comfortable time to travel is during the second trimester  Ask your healthcare provider if you can travel after 36 weeks  You may not be able to travel in an airplane after 36 weeks  He may also recommend that you avoid long road trips  What changes are happening with my baby? By 38 weeks, your baby may weigh between 6 and 9 pounds  Your baby may be about 14 inches long from the top of the head to the rump (baby's bottom)  Your baby hears well enough to know your voice  As your baby gets larger, you may feel fewer kicks and more stretching and rolling  Your baby may move into a head-down position  Your baby will also rest lower in your abdomen  What do I need to know about prenatal care?   Your healthcare provider will check your blood pressure and weight  You may also need the following:  · A urine test  may also be done to check for sugar and protein  These can be signs of gestational diabetes or infection  Protein in your urine may also be a sign of preeclampsia  Preeclampsia is a condition that can develop during week 20 or later of your pregnancy  It causes high blood pressure, and it can cause problems with your kidneys and other organs  · A blood test  may be done to check for anemia (low iron level)  · A Tdap vaccine  may be recommended by your healthcare provider  · A group B strep test  is a test that is done to check for group B strep infection  Group B strep is a type of bacteria that may be found in the vagina or rectum  It can be passed to your baby during delivery if you have it  Your healthcare provider will take swab your vagina or rectum and send the sample to the lab for tests  · Fundal height  is a measurement of your uterus to check your baby's growth  This number is usually the same as the number of weeks that you have been pregnant  Your healthcare provider may also check your baby's position  · Your baby's heart rate  will be checked  When should I seek immediate care? · You develop a severe headache that does not go away  · You have new or increased vision changes, such as blurred or spotted vision  · You have new or increased swelling in your face or hands  · You have vaginal spotting or bleeding  · Your water broke or you feel warm water gushing or trickling from your vagina  When should I contact my healthcare provider? · You have more than 5 contractions in 1 hour  · You notice any changes in your baby's movements  · You have abdominal cramps, pressure, or tightening  · You have a change in vaginal discharge  · You have chills or a fever  · You have vaginal itching, burning, or pain  · You have yellow, green, white, or foul-smelling vaginal discharge      · You have pain or burning when you urinate, less urine than usual, or pink or bloody urine  · You have questions or concerns about your condition or care  CARE AGREEMENT:   You have the right to help plan your care  Learn about your health condition and how it may be treated  Discuss treatment options with your caregivers to decide what care you want to receive  You always have the right to refuse treatment  The above information is an  only  It is not intended as medical advice for individual conditions or treatments  Talk to your doctor, nurse or pharmacist before following any medical regimen to see if it is safe and effective for you  © 2017 [employer] [employer] Information is for End User's use only and may not be sold, redistributed or otherwise used for commercial purposes  All illustrations and images included in CareNotes® are the copyrighted property of [employer]  or [employer]

## 2019-08-05 NOTE — PROGRESS NOTES
Problem List Items Addressed This Visit        Endocrine    Insulin controlled gestational diabetes mellitus (GDM) in third trimester       Other    Supervision of other normal pregnancy, antepartum - Primary     GDM- PNC follow up for insulin dosing  Plan for induction of labor 39-40 weeks if no spontaneous labor  APFS testing underway    Signs of labor, fetal kick counts reviewed           37 weeks gestation of pregnancy

## 2019-08-05 NOTE — ASSESSMENT & PLAN NOTE
GDM- PNC follow up for insulin dosing  Plan for induction of labor 39-40 weeks if no spontaneous labor  APFS testing underway    Signs of labor, fetal kick counts reviewed

## 2019-08-06 ENCOUNTER — ULTRASOUND (OUTPATIENT)
Dept: PERINATAL CARE | Facility: OTHER | Age: 29
End: 2019-08-06
Payer: COMMERCIAL

## 2019-08-06 VITALS
DIASTOLIC BLOOD PRESSURE: 55 MMHG | HEIGHT: 63 IN | BODY MASS INDEX: 31.41 KG/M2 | WEIGHT: 177.3 LBS | HEART RATE: 90 BPM | SYSTOLIC BLOOD PRESSURE: 111 MMHG

## 2019-08-06 DIAGNOSIS — Z34.80 SUPERVISION OF OTHER NORMAL PREGNANCY, ANTEPARTUM: ICD-10-CM

## 2019-08-06 DIAGNOSIS — O24.414 INSULIN CONTROLLED GESTATIONAL DIABETES MELLITUS (GDM) IN THIRD TRIMESTER: Primary | ICD-10-CM

## 2019-08-06 DIAGNOSIS — Z3A.37 37 WEEKS GESTATION OF PREGNANCY: ICD-10-CM

## 2019-08-06 PROCEDURE — 59025 FETAL NON-STRESS TEST: CPT | Performed by: OBSTETRICS & GYNECOLOGY

## 2019-08-06 PROCEDURE — 76815 OB US LIMITED FETUS(S): CPT | Performed by: OBSTETRICS & GYNECOLOGY

## 2019-08-09 ENCOUNTER — ROUTINE PRENATAL (OUTPATIENT)
Dept: PERINATAL CARE | Facility: OTHER | Age: 29
End: 2019-08-09
Payer: COMMERCIAL

## 2019-08-09 VITALS
WEIGHT: 178 LBS | DIASTOLIC BLOOD PRESSURE: 68 MMHG | HEART RATE: 91 BPM | SYSTOLIC BLOOD PRESSURE: 102 MMHG | HEIGHT: 63 IN | BODY MASS INDEX: 31.54 KG/M2

## 2019-08-09 DIAGNOSIS — O24.414 INSULIN CONTROLLED GESTATIONAL DIABETES MELLITUS (GDM) IN THIRD TRIMESTER: Primary | ICD-10-CM

## 2019-08-09 PROCEDURE — 59025 FETAL NON-STRESS TEST: CPT | Performed by: OBSTETRICS & GYNECOLOGY

## 2019-08-11 NOTE — PROGRESS NOTES
[address]: Ms Lizzette Rooney was seen at 38w1d for NST (found under the pregnancy episode) which I reviewed the RN assessment and agree  Please don't hesitate to contact our office with any concerns or questions    Viviana Medrano MD

## 2019-08-12 ENCOUNTER — ROUTINE PRENATAL (OUTPATIENT)
Dept: OBGYN CLINIC | Facility: CLINIC | Age: 29
End: 2019-08-12

## 2019-08-12 VITALS
DIASTOLIC BLOOD PRESSURE: 82 MMHG | BODY MASS INDEX: 31.95 KG/M2 | SYSTOLIC BLOOD PRESSURE: 122 MMHG | WEIGHT: 180.38 LBS

## 2019-08-12 DIAGNOSIS — Z34.83 ENCOUNTER FOR SUPERVISION OF OTHER NORMAL PREGNANCY IN THIRD TRIMESTER: Primary | ICD-10-CM

## 2019-08-12 DIAGNOSIS — O24.414 INSULIN CONTROLLED GESTATIONAL DIABETES MELLITUS (GDM) IN THIRD TRIMESTER: ICD-10-CM

## 2019-08-12 DIAGNOSIS — Z3A.38 38 WEEKS GESTATION OF PREGNANCY: ICD-10-CM

## 2019-08-12 DIAGNOSIS — Z34.80 SUPERVISION OF OTHER NORMAL PREGNANCY, ANTEPARTUM: ICD-10-CM

## 2019-08-12 LAB
SL AMB  POCT GLUCOSE, UA: NEGATIVE
SL AMB POCT URINE PROTEIN: NEGATIVE

## 2019-08-12 PROCEDURE — PNV: Performed by: OBSTETRICS & GYNECOLOGY

## 2019-08-12 NOTE — PATIENT INSTRUCTIONS
Induction of Labor   WHAT YOU NEED TO KNOW:   What is induction of labor? Induction of labor is a procedure to induce (start) your labor before it begins on its own  Medicines and other methods are used to start contractions and help your cervix soften, thin (efface), and dilate (open)  Why might I need induction of labor? · Health problems you have, such as high blood pressure or diabetes    · Health problems your baby has, such as a slow heartbeat or poor growth inside the womb     · Problems related to your pregnancy such as infection of the amniotic fluid, your water breaks before labor begins, or you have too little amniotic fluid    · You are past your due date  What happens during induction of labor? Your healthcare provider may use one or more of the following to induce labor:  · Cervical ripening  is a process that helps to soften and thin out your cervix  Medicines called prostaglandins may be used to ripen your cervix  These medicines can be inserted into your vagina or taken as a pill  Other methods can also be used to dilate the cervix  This includes a catheter with an inflatable balloon on the end that is inserted into your cervix  Saline injected through the catheter helps the balloon to expand  A substance that absorbs water may also be inserted into your cervix to help dilate it  · Stripping the membranes  is a procedure that causes your body to release prostaglandins naturally  Prostaglandins soften the cervix and may help to cause contractions  Your healthcare provider will sweep a gloved finger over the membranes that connect the amniotic sac to the uterus wall  · Rupturing the amniotic sac  is a procedure that is used to cause your water to break  Your healthcare provider will use a small tool to make a hole in your amniotic sac  This may help contractions to start  · Oxytocin  may be given through an IV to cause contractions to start and stay strong and regular    What are the risks of induction of labor? Medicines used to induce labor may cause too many contractions  This can lower your baby's heartbeat and decrease his or her oxygen supply  Induction of labor also increases the risk of umbilical cord prolapse  This condition causes the umbilical cord to slip back into the vagina before delivery  It can compress the cord and decrease your baby's oxygen supply  Medical induction may cause an infection in you or your baby  Medical induction may also increase your risk for a , especially if it is the first time you give birth  Your uterus may rupture if you have had a  section () before  CARE AGREEMENT:   You have the right to help plan your care  Learn about your health condition and how it may be treated  Discuss treatment options with your caregivers to decide what care you want to receive  You always have the right to refuse treatment  The above information is an  only  It is not intended as medical advice for individual conditions or treatments  Talk to your doctor, nurse or pharmacist before following any medical regimen to see if it is safe and effective for you  ©  2600 [facility] Information is for End User's use only and may not be sold, redistributed or otherwise used for commercial purposes  All illustrations and images included in CareNotes® are the copyrighted property of [employer] A M [employer]  or [employer]

## 2019-08-12 NOTE — PROGRESS NOTES
Problem List Items Addressed This Visit        Endocrine    Insulin controlled gestational diabetes mellitus (GDM) in third trimester       Other    Supervision of other normal pregnancy, antepartum     Plan for induction this week  8/16/19 pit arom         38 weeks gestation of pregnancy      Other Visit Diagnoses     Encounter for supervision of other normal pregnancy in third trimester    -  Primary    Relevant Orders    POCT urine dip (Completed)

## 2019-08-12 NOTE — PROGRESS NOTES
Pt reports GFM  Pt requesting refill on her insulin  Urine dip negative  Pt would also like to be checked for dilation

## 2019-08-13 ENCOUNTER — ULTRASOUND (OUTPATIENT)
Dept: PERINATAL CARE | Facility: OTHER | Age: 29
End: 2019-08-13
Payer: COMMERCIAL

## 2019-08-13 VITALS
SYSTOLIC BLOOD PRESSURE: 113 MMHG | WEIGHT: 179.6 LBS | BODY MASS INDEX: 31.82 KG/M2 | DIASTOLIC BLOOD PRESSURE: 74 MMHG | HEART RATE: 89 BPM | HEIGHT: 63 IN

## 2019-08-13 DIAGNOSIS — Z34.80 SUPERVISION OF OTHER NORMAL PREGNANCY, ANTEPARTUM: ICD-10-CM

## 2019-08-13 DIAGNOSIS — O24.414 INSULIN CONTROLLED GESTATIONAL DIABETES MELLITUS (GDM) IN THIRD TRIMESTER: ICD-10-CM

## 2019-08-13 DIAGNOSIS — Z3A.38 38 WEEKS GESTATION OF PREGNANCY: ICD-10-CM

## 2019-08-13 PROCEDURE — 76815 OB US LIMITED FETUS(S): CPT | Performed by: OBSTETRICS & GYNECOLOGY

## 2019-08-13 PROCEDURE — 76818 FETAL BIOPHYS PROFILE W/NST: CPT | Performed by: OBSTETRICS & GYNECOLOGY

## 2019-08-13 PROCEDURE — 59025 FETAL NON-STRESS TEST: CPT | Performed by: OBSTETRICS & GYNECOLOGY

## 2019-08-13 NOTE — PROGRESS NOTES
Variables deceleration with Emiliano 115 lasting 20 seconds, spontaneously returned to baseline  Tracing reviewed with Dr Aaron Go who suggested that the combination of the need for FAS and then a deceleration with the ctx made this tracing a non reactive and BPP is needed    Shirley Paiz was informed about the plan of care and she verbalized understandings and worried if the BPP had a co-pay but allowed us to perform the test

## 2019-08-14 ENCOUNTER — DOCUMENTATION (OUTPATIENT)
Dept: PERINATAL CARE | Facility: CLINIC | Age: 29
End: 2019-08-14

## 2019-08-14 LAB
EST. AVERAGE GLUCOSE BLD GHB EST-MCNC: 111 (CALC)
EST. AVERAGE GLUCOSE BLD GHB EST-SCNC: 6.2 (CALC)
HBA1C MFR BLD: 5.5 % OF TOTAL HGB

## 2019-08-14 NOTE — PROGRESS NOTES
Date:  19  RE: Madison Duval    : 1990  Estimated Date of Delivery: 19  EGA: 38w6d  OB/GYN: Fide Moise  Insulin controlled GDM third trimester         Blood sugars copied from patient email  Dates: -  Current regimen:  Levemir 30 units at 11 PM daily  2000 calorie gestational diabetes diet; 3 meals and 3 snacks  SMBG 4 times per day; fasting and 2 hours pp with a Verio OneTouch blood glucose meter  Patient was previously advised to check FBG measurement following no longer than a 8-10 hour fast  Declines Metformin  Plan:  3 PP elevations noted  Pt notes that she occasionally is testing 15-25 minutes early than the 2 hr sakshi  Fasting values at target limit, informed pt she might need small increase on next report  Levemir- continue 30 units at 11 PM daily, split dose into 2 separate injections of 15 one right after the other in 2 separate injection sites for best absorption  Continue GDM diet, eating every 2 to 3 5 hours with recommended combination of carbohydrates, protein and fat per meal/snack  Advised when repeating a similar meal that caused a 2 hr pp >119 mg/dl to decrease 1 carbohydrate and increase 1 protein serving  No more than 8 to 10 hours of fasting overnight  Pt previously advised patient to avoid high fat meals at fast food restaurants  Stay active if no restriction from your OB, up to 30 minutes a day of walking     US (19): CLAUDIA normal    HgA1C () 5 5% compared to HGA1C (): 5 4%    Date due to report next:      Elayne Hernández, RD,LDN  Diabetes Educator   Diabetes and Pregnancy Program

## 2019-08-16 ENCOUNTER — ANESTHESIA (INPATIENT)
Dept: ANESTHESIOLOGY | Facility: HOSPITAL | Age: 29
End: 2019-08-16
Payer: COMMERCIAL

## 2019-08-16 ENCOUNTER — HOSPITAL ENCOUNTER (INPATIENT)
Facility: HOSPITAL | Age: 29
LOS: 2 days | Discharge: HOME | End: 2019-08-18
Attending: OBSTETRICS & GYNECOLOGY | Admitting: OBSTETRICS & GYNECOLOGY
Payer: COMMERCIAL

## 2019-08-16 ENCOUNTER — ANESTHESIA EVENT (INPATIENT)
Dept: ANESTHESIOLOGY | Facility: HOSPITAL | Age: 29
End: 2019-08-16
Payer: COMMERCIAL

## 2019-08-16 ENCOUNTER — HOSPITAL ENCOUNTER (OUTPATIENT)
Dept: LABOR AND DELIVERY | Facility: HOSPITAL | Age: 29
Discharge: HOME | End: 2019-08-16
Payer: COMMERCIAL

## 2019-08-16 DIAGNOSIS — Z3A.38 38 WEEKS GESTATION OF PREGNANCY: Primary | ICD-10-CM

## 2019-08-16 DIAGNOSIS — R52 POSTPARTUM PAIN: ICD-10-CM

## 2019-08-16 PROBLEM — Z3A.39 39 WEEKS GESTATION OF PREGNANCY: Status: ACTIVE | Noted: 2019-07-23

## 2019-08-16 LAB
ABO GROUP BLD: NORMAL
AMPHETAMINES SERPL QL SCN: NEGATIVE
BARBITURATES UR QL: NEGATIVE
BASE EXCESS BLDCOA CALC-SCNC: -3.1 MMOL/L (ref 3–11)
BASE EXCESS BLDCOV CALC-SCNC: -3.3 MMOL/L (ref 1–9)
BENZODIAZ UR QL: NEGATIVE
BLD GP AB SCN SERPL QL: NEGATIVE
COCAINE UR QL: NEGATIVE
ERYTHROCYTE [DISTWIDTH] IN BLOOD BY AUTOMATED COUNT: 13.2 % (ref 11.6–15.1)
GLUCOSE SERPL-MCNC: 68 MG/DL (ref 65–140)
GLUCOSE SERPL-MCNC: 78 MG/DL (ref 65–140)
GLUCOSE SERPL-MCNC: 78 MG/DL (ref 65–140)
GLUCOSE SERPL-MCNC: 98 MG/DL (ref 65–140)
HCO3 BLDCOA-SCNC: 23 MMOL/L (ref 17.3–27.3)
HCO3 BLDCOV-SCNC: 22.2 MMOL/L (ref 12.2–28.6)
HCT VFR BLD AUTO: 37.5 % (ref 34.8–46.1)
HGB BLD-MCNC: 12.5 G/DL (ref 11.5–15.4)
MCH RBC QN AUTO: 31.1 PG (ref 26.8–34.3)
MCHC RBC AUTO-ENTMCNC: 33.3 G/DL (ref 31.4–37.4)
MCV RBC AUTO: 93 FL (ref 82–98)
METHADONE UR QL: NEGATIVE
O2 CT VFR BLDCOA CALC: 18.5 ML/DL
OPIATES UR QL SCN: NEGATIVE
OXYHGB MFR BLDCOA: 77.7 %
OXYHGB MFR BLDCOV: 75.9 %
PCO2 BLDCOA: 44.8 MM[HG] (ref 30–60)
PCO2 BLDCOV: 41.8 MM HG (ref 27–43)
PCP UR QL: NEGATIVE
PH BLDCOA: 7.33 [PH] (ref 7.23–7.43)
PH BLDCOV: 7.34 [PH] (ref 7.19–7.49)
PLATELET # BLD AUTO: 189 THOUSANDS/UL (ref 149–390)
PMV BLD AUTO: 10 FL (ref 8.9–12.7)
PO2 BLDCOA: 34.3 MM HG (ref 5–25)
PO2 BLDCOV: 32.8 MM HG (ref 15–45)
RBC # BLD AUTO: 4.02 MILLION/UL (ref 3.81–5.12)
RH BLD: POSITIVE
SAO2 % BLDCOV: 18.5 ML/DL
SPECIMEN EXPIRATION DATE: NORMAL
THC UR QL: NEGATIVE
WBC # BLD AUTO: 8.81 THOUSAND/UL (ref 4.31–10.16)

## 2019-08-16 PROCEDURE — 3E033VJ INTRODUCTION OF OTHER HORMONE INTO PERIPHERAL VEIN, PERCUTANEOUS APPROACH: ICD-10-PCS | Performed by: OBSTETRICS & GYNECOLOGY

## 2019-08-16 PROCEDURE — 4A1HXCZ MONITORING OF PRODUCTS OF CONCEPTION, CARDIAC RATE, EXTERNAL APPROACH: ICD-10-PCS | Performed by: OBSTETRICS & GYNECOLOGY

## 2019-08-16 PROCEDURE — 99024 POSTOP FOLLOW-UP VISIT: CPT | Performed by: OBSTETRICS & GYNECOLOGY

## 2019-08-16 PROCEDURE — 82948 REAGENT STRIP/BLOOD GLUCOSE: CPT

## 2019-08-16 PROCEDURE — 59400 OBSTETRICAL CARE: CPT | Performed by: OBSTETRICS & GYNECOLOGY

## 2019-08-16 PROCEDURE — 82805 BLOOD GASES W/O2 SATURATION: CPT | Performed by: OBSTETRICS & GYNECOLOGY

## 2019-08-16 PROCEDURE — 86592 SYPHILIS TEST NON-TREP QUAL: CPT | Performed by: OBSTETRICS & GYNECOLOGY

## 2019-08-16 PROCEDURE — 86901 BLOOD TYPING SEROLOGIC RH(D): CPT | Performed by: OBSTETRICS & GYNECOLOGY

## 2019-08-16 PROCEDURE — 85027 COMPLETE CBC AUTOMATED: CPT | Performed by: OBSTETRICS & GYNECOLOGY

## 2019-08-16 PROCEDURE — 86850 RBC ANTIBODY SCREEN: CPT | Performed by: OBSTETRICS & GYNECOLOGY

## 2019-08-16 PROCEDURE — 86900 BLOOD TYPING SEROLOGIC ABO: CPT | Performed by: OBSTETRICS & GYNECOLOGY

## 2019-08-16 PROCEDURE — 0KQM0ZZ REPAIR PERINEUM MUSCLE, OPEN APPROACH: ICD-10-PCS | Performed by: OBSTETRICS & GYNECOLOGY

## 2019-08-16 PROCEDURE — 80307 DRUG TEST PRSMV CHEM ANLYZR: CPT | Performed by: OBSTETRICS & GYNECOLOGY

## 2019-08-16 PROCEDURE — 10907ZC DRAINAGE OF AMNIOTIC FLUID, THERAPEUTIC FROM PRODUCTS OF CONCEPTION, VIA NATURAL OR ARTIFICIAL OPENING: ICD-10-PCS | Performed by: OBSTETRICS & GYNECOLOGY

## 2019-08-16 PROCEDURE — NC001 PR NO CHARGE: Performed by: OBSTETRICS & GYNECOLOGY

## 2019-08-16 RX ORDER — ONDANSETRON 2 MG/ML
4 INJECTION INTRAMUSCULAR; INTRAVENOUS EVERY 6 HOURS PRN
Status: DISCONTINUED | OUTPATIENT
Start: 2019-08-16 | End: 2019-08-16

## 2019-08-16 RX ORDER — IBUPROFEN 600 MG/1
600 TABLET ORAL EVERY 6 HOURS PRN
Status: DISCONTINUED | OUTPATIENT
Start: 2019-08-16 | End: 2019-08-18 | Stop reason: HOSPADM

## 2019-08-16 RX ORDER — DOCUSATE SODIUM 100 MG/1
100 CAPSULE, LIQUID FILLED ORAL 2 TIMES DAILY
Status: DISCONTINUED | OUTPATIENT
Start: 2019-08-17 | End: 2019-08-18 | Stop reason: HOSPADM

## 2019-08-16 RX ORDER — DIAPER,BRIEF,INFANT-TODD,DISP
1 EACH MISCELLANEOUS 3 TIMES DAILY
Status: DISCONTINUED | OUTPATIENT
Start: 2019-08-16 | End: 2019-08-16

## 2019-08-16 RX ORDER — ACETAMINOPHEN 325 MG/1
650 TABLET ORAL EVERY 6 HOURS PRN
Status: DISCONTINUED | OUTPATIENT
Start: 2019-08-16 | End: 2019-08-18 | Stop reason: HOSPADM

## 2019-08-16 RX ORDER — SODIUM CHLORIDE 9 MG/ML
125 INJECTION, SOLUTION INTRAVENOUS CONTINUOUS
Status: DISCONTINUED | OUTPATIENT
Start: 2019-08-16 | End: 2019-08-16

## 2019-08-16 RX ORDER — OXYTOCIN/RINGER'S LACTATE 30/500 ML
250 PLASTIC BAG, INJECTION (ML) INTRAVENOUS CONTINUOUS
Status: DISCONTINUED | OUTPATIENT
Start: 2019-08-16 | End: 2019-08-18 | Stop reason: HOSPADM

## 2019-08-16 RX ORDER — DIAPER,BRIEF,INFANT-TODD,DISP
1 EACH MISCELLANEOUS 4 TIMES DAILY PRN
Status: DISCONTINUED | OUTPATIENT
Start: 2019-08-16 | End: 2019-08-18 | Stop reason: HOSPADM

## 2019-08-16 RX ORDER — LIDOCAINE HYDROCHLORIDE AND EPINEPHRINE 15; 5 MG/ML; UG/ML
INJECTION, SOLUTION EPIDURAL AS NEEDED
Status: DISCONTINUED | OUTPATIENT
Start: 2019-08-16 | End: 2019-08-16 | Stop reason: SURG

## 2019-08-16 RX ORDER — OXYTOCIN/RINGER'S LACTATE 30/500 ML
1-30 PLASTIC BAG, INJECTION (ML) INTRAVENOUS
Status: DISCONTINUED | OUTPATIENT
Start: 2019-08-16 | End: 2019-08-16

## 2019-08-16 RX ORDER — OXYCODONE HYDROCHLORIDE AND ACETAMINOPHEN 5; 325 MG/1; MG/1
1 TABLET ORAL EVERY 4 HOURS PRN
Status: DISCONTINUED | OUTPATIENT
Start: 2019-08-16 | End: 2019-08-18 | Stop reason: HOSPADM

## 2019-08-16 RX ORDER — BUPIVACAINE HYDROCHLORIDE 2.5 MG/ML
INJECTION, SOLUTION INFILTRATION; PERINEURAL AS NEEDED
Status: DISCONTINUED | OUTPATIENT
Start: 2019-08-16 | End: 2019-08-16 | Stop reason: SURG

## 2019-08-16 RX ORDER — OXYCODONE HYDROCHLORIDE AND ACETAMINOPHEN 5; 325 MG/1; MG/1
2 TABLET ORAL EVERY 4 HOURS PRN
Status: DISCONTINUED | OUTPATIENT
Start: 2019-08-16 | End: 2019-08-18 | Stop reason: HOSPADM

## 2019-08-16 RX ORDER — SODIUM CHLORIDE, SODIUM LACTATE, POTASSIUM CHLORIDE, CALCIUM CHLORIDE 600; 310; 30; 20 MG/100ML; MG/100ML; MG/100ML; MG/100ML
125 INJECTION, SOLUTION INTRAVENOUS CONTINUOUS
Status: DISCONTINUED | OUTPATIENT
Start: 2019-08-16 | End: 2019-08-16

## 2019-08-16 RX ADMIN — Medication 2 MILLI-UNITS/MIN: at 09:34

## 2019-08-16 RX ADMIN — BENZOCAINE AND LEVOMENTHOL: 200; 5 SPRAY TOPICAL at 18:54

## 2019-08-16 RX ADMIN — LIDOCAINE HYDROCHLORIDE AND EPINEPHRINE 3 ML: 15; 5 INJECTION, SOLUTION EPIDURAL at 11:46

## 2019-08-16 RX ADMIN — BUPIVACAINE HYDROCHLORIDE 1 ML: 2.5 INJECTION, SOLUTION INFILTRATION; PERINEURAL at 11:45

## 2019-08-16 RX ADMIN — WITCH HAZEL 1 PAD: 500 SOLUTION RECTAL; TOPICAL at 18:54

## 2019-08-16 RX ADMIN — SODIUM CHLORIDE 125 ML/HR: 0.9 INJECTION, SOLUTION INTRAVENOUS at 08:25

## 2019-08-16 RX ADMIN — OXYCODONE HYDROCHLORIDE AND ACETAMINOPHEN 1 TABLET: 5; 325 TABLET ORAL at 21:59

## 2019-08-16 RX ADMIN — ROPIVACAINE HYDROCHLORIDE: 2 INJECTION, SOLUTION EPIDURAL; INFILTRATION at 12:14

## 2019-08-16 RX ADMIN — ONDANSETRON 4 MG: 2 INJECTION INTRAMUSCULAR; INTRAVENOUS at 13:45

## 2019-08-16 RX ADMIN — SODIUM CHLORIDE 125 ML/HR: 0.9 INJECTION, SOLUTION INTRAVENOUS at 11:47

## 2019-08-16 RX ADMIN — HYDROCORTISONE 1 APPLICATION: 1 CREAM TOPICAL at 18:54

## 2019-08-16 RX ADMIN — Medication 250 MILLI-UNITS/MIN: at 16:34

## 2019-08-16 NOTE — OB LABOR/OXYTOCIN SAFETY PROGRESS
Labor Progress Note - Adolfo Medrano 29 y o  female MRN: [id number]    Unit/Bed#: -01 Encounter: [id number]    OB History    Para Term  AB Living   2 1 1 0 0 1   SAB TAB Ectopic Multiple Live Births   0 0 0 0 1     Gestational Age: 39w1d     Contraction Frequency (minutes): 3-5  Contraction Quality: Not applicable  Tachysystole: No   Dilation: 3        Effacement (%): 70  Station: -1  Baseline Rate: 125 bpm  Fetal Heart Rate: 129 BPM  FHR Category: Category I          Notes/comments: Patient is comfortable  Will begin Pitocin titration  Discussed w/ Attending              Tamy Deluca MD 2019 8:52 AM

## 2019-08-16 NOTE — PLAN OF CARE
Problem: ANTEPARTUM  Goal: Maintain pregnancy as long as maternal and/or fetal condition is stable  Description  INTERVENTIONS:  - Maternal surveillance  - Fetal surveillance  - Monitor uterine activity  - Medications as ordered  - Bedrest  Outcome: Completed     Problem: BIRTH - VAGINAL/ SECTION  Goal: Fetal and maternal status remain reassuring during the birth process  Description  INTERVENTIONS:  - Monitor vital signs  - Monitor fetal heart rate  - Monitor uterine activity  - Monitor labor progression (vaginal delivery)  - DVT prophylaxis  - Antibiotic prophylaxis  Outcome: Progressing  Goal: Emotionally satisfying birthing experience for mother/fetus  Description  Interventions:  - Assess, plan, implement and evaluate the nursing care given to the patient in labor  - Advocate the philosophy that each childbirth experience is a unique experience and support the family's chosen level of involvement and control during the labor process   - Actively participate in both the patient's and family's teaching of the birth process  - Consider cultural, Mandaeism and age-specific factors and plan care for the patient in labor  Outcome: Progressing     Problem: Knowledge Deficit  Goal: Verbalizes understanding of labor plan  Description  Assess patient/family/caregiver's baseline knowledge level and ability to understand information  Provide education via patient/family/caregiver's preferred learning method at appropriate level of understanding  1  Provide teaching at level of understanding  2  Provide teaching via preferred learning method(s)  Outcome: Progressing  Goal: Patient/family/caregiver demonstrates understanding of disease process, treatment plan, medications, and discharge instructions  Description  Complete learning assessment and assess knowledge base    Interventions:  - Provide teaching at level of understanding  - Provide teaching via preferred learning methods  Outcome: Progressing Problem: Labor & Delivery  Goal: Manages discomfort  Description  Assess and monitor for signs and symptoms of discomfort  Assess patient's pain level regularly and per hospital policy  Administer medications as ordered  Support use of nonpharmacological methods to help control pain such as distraction, imagery, relaxation, and application of heat and cold  Collaborate with interdisciplinary team and patient to determine appropriate pain management plan  1  Include patient in decisions related to comfort  2  Offer non-pharmacological pain management interventions  3  Report ineffective pain management to physician  Outcome: Progressing  Goal: Patient vital signs are stable  Description  1  Assess vital signs - vaginal delivery  Outcome: Progressing     Problem: PAIN - ADULT  Goal: Verbalizes/displays adequate comfort level or baseline comfort level  Description  Interventions:  - Encourage patient to monitor pain and request assistance  - Assess pain using appropriate pain scale  - Administer analgesics based on type and severity of pain and evaluate response  - Implement non-pharmacological measures as appropriate and evaluate response  - Consider cultural and social influences on pain and pain management  - Notify physician/advanced practitioner if interventions unsuccessful or patient reports new pain  Outcome: Progressing     Problem: SAFETY ADULT  Goal: Patient will remain free of falls  Description  INTERVENTIONS:  - Assess patient frequently for physical needs  -  Identify cognitive and physical deficits and behaviors that affect risk of falls    -  [place] fall precautions as indicated by assessment   - Educate patient/family on patient safety including physical limitations  - Instruct patient to call for assistance with activity based on assessment  - Modify environment to reduce risk of injury  - Consider OT/PT consult to assist with strengthening/mobility  Outcome: Progressing  Goal: Maintain or return to baseline ADL function  Description  INTERVENTIONS:  -  Assess patient's ability to carry out ADLs; assess patient's baseline for ADL function and identify physical deficits which impact ability to perform ADLs (bathing, care of mouth/teeth, toileting, grooming, dressing, etc )  - Assess/evaluate cause of self-care deficits   - Assess range of motion  - Assess patient's mobility; develop plan if impaired  - Assess patient's need for assistive devices and provide as appropriate  - Encourage maximum independence but intervene and supervise when necessary  - Involve family in performance of ADLs  - Assess for home care needs following discharge   - Consider OT consult to assist with ADL evaluation and planning for discharge  - Provide patient education as appropriate  Outcome: Progressing  Goal: Maintain or return mobility status to optimal level  Description  INTERVENTIONS:  - Assess patient's baseline mobility status (ambulation, transfers, stairs, etc )    - Identify cognitive and physical deficits and behaviors that affect mobility  - Identify mobility aids required to assist with transfers and/or ambulation (gait belt, sit-to-stand, lift, walker, cane, etc )  - [place] fall precautions as indicated by assessment  - Record patient progress and toleration of activity level on Mobility SBAR; progress patient to next Phase/Stage  - Instruct patient to call for assistance with activity based on assessment  - Consider rehabilitation consult to assist with strengthening/weightbearing, etc   Outcome: Progressing     Problem: DISCHARGE PLANNING  Goal: Discharge to home or other facility with appropriate resources  Description  INTERVENTIONS:  - Identify barriers to discharge w/patient and caregiver  - Arrange for needed discharge resources and transportation as appropriate  - Identify discharge learning needs (meds, wound care, etc )  - Arrange for interpretive services to assist at discharge as needed  - Refer to Case Management Department for coordinating discharge planning if the patient needs post-hospital services based on physician/advanced practitioner order or complex needs related to functional status, cognitive ability, or social support system  Outcome: Progressing

## 2019-08-16 NOTE — DISCHARGE SUMMARY
Discharge Summary - OB/GYN   Riaz Jurado 29 y o  female MRN: [id number]  Unit/Bed#: -01 Encounter: [id number]      Admission Date: 2019     Discharge Date: 2019    Admitting Diagnosis:   1  Pregnancy at 39w1d  2  A2GDM    Discharge Diagnosis:   Same, delivered      Procedures: spontaneous vaginal delivery    Attending: Samara Lopez MD    Hospital Course:     Riaz Jurado is a 29 y o  Authur Ducking at 39w1d wks who was initially admitted for induction of labor  She delivered a viable male  on 19 at 1524  Weight 7lbs 9oz via spontaneous vaginal delivery  Apgars were 9 (1 min) and 9 (5 min)   was transferred to  nursery  Patient tolerated the procedure well and was transferred to recovery in stable condition  Her post-partum course was uncomplicated   Her post-partum pain was well controlled with oral analgesics  On day of discharge, she was ambulating and able to reasonably perform all ADLs  She was voiding and had appropriate bowel function  Pain was well controlled  She was discharged home on post-partum day #2 without complications  Patient was instructed to follow up with her OB as an outpatient and was given appropriate warnings to call provider if she develops signs of infection or uncontrolled pain  Complications: none apparent    Condition at discharge: good     Discharge instructions/Information to patient and family:   See after visit summary for information provided to patient and family  Provisions for Follow-Up Care:  See after visit summary for information related to follow-up care and any pertinent home health orders  Disposition: Home    Planned Readmission: No    Discharge Medications: For a complete list of the patient's medications, please refer to her medical orders

## 2019-08-16 NOTE — PLAN OF CARE
Problem: POSTPARTUM  Goal: Experiences normal postpartum course  Description  INTERVENTIONS:  - Monitor maternal vital signs  - Assess uterine involution and lochia  Outcome: Progressing  Goal: Appropriate maternal -  bonding  Description  INTERVENTIONS:  - Identify family support  - Assess for appropriate maternal/infant bonding   -Encourage maternal/infant bonding opportunities  - Referral to  or  as needed  Outcome: Progressing  Goal: Establishment of infant feeding pattern  Description  INTERVENTIONS:  - Assess breast/bottle feeding  - Refer to lactation as needed  Outcome: Progressing     Problem: Knowledge Deficit  Goal: Verbalizes understanding of labor plan  Description  Assess patient/family/caregiver's baseline knowledge level and ability to understand information  Provide education via patient/family/caregiver's preferred learning method at appropriate level of understanding  1  Provide teaching at level of understanding  2  Provide teaching via preferred learning method(s)  2019 by Niecy Parrish RN  Outcome: Completed  2019 100 by Niecy Parrish RN  Outcome: Progressing  Goal: Patient/family/caregiver demonstrates understanding of disease process, treatment plan, medications, and discharge instructions  Description  Complete learning assessment and assess knowledge base    Interventions:  - Provide teaching at level of understanding  - Provide teaching via preferred learning methods  2019 by Niecy Parrish RN  Outcome: Completed  2019 1004 by Niecy Parrish RN  Outcome: Progressing    Problem: PAIN - ADULT  Goal: Verbalizes/displays adequate comfort level or baseline comfort level  Description  Interventions:  - Encourage patient to monitor pain and request assistance  - Assess pain using appropriate pain scale  - Administer analgesics based on type and severity of pain and evaluate response  - Implement non-pharmacological measures as appropriate and evaluate response  - Consider cultural and social influences on pain and pain management  - Notify physician/advanced practitioner if interventions unsuccessful or patient reports new pain  8/16/2019 1800 by Gerson Brand RN  Outcome: Progressing  8/16/2019 1004 by Gerson Brand RN  Outcome: Progressing     Problem: SAFETY ADULT  Goal: Patient will remain free of falls  Description  INTERVENTIONS:  - Assess patient frequently for physical needs  -  Identify cognitive and physical deficits and behaviors that affect risk of falls    -  [place] fall precautions as indicated by assessment   - Educate patient/family on patient safety including physical limitations  - Instruct patient to call for assistance with activity based on assessment  - Modify environment to reduce risk of injury  - Consider OT/PT consult to assist with strengthening/mobility  8/16/2019 1800 by Gerson Brand RN  Outcome: Progressing  8/16/2019 1004 by Gerson Brand RN  Outcome: Progressing  Goal: Maintain or return to baseline ADL function  Description  INTERVENTIONS:  -  Assess patient's ability to carry out ADLs; assess patient's baseline for ADL function and identify physical deficits which impact ability to perform ADLs (bathing, care of mouth/teeth, toileting, grooming, dressing, etc )  - Assess/evaluate cause of self-care deficits   - Assess range of motion  - Assess patient's mobility; develop plan if impaired  - Assess patient's need for assistive devices and provide as appropriate  - Encourage maximum independence but intervene and supervise when necessary  - Involve family in performance of ADLs  - Assess for home care needs following discharge   - Consider OT consult to assist with ADL evaluation and planning for discharge  - Provide patient education as appropriate  8/16/2019 1800 by Gerson Brand RN  Outcome: Progressing  8/16/2019 1004 by Gerson Brand RN  Outcome: Progressing  Goal: Maintain or return mobility status to optimal level  Description  INTERVENTIONS:  - Assess patient's baseline mobility status (ambulation, transfers, stairs, etc )    - Identify cognitive and physical deficits and behaviors that affect mobility  - Identify mobility aids required to assist with transfers and/or ambulation (gait belt, sit-to-stand, lift, walker, cane, etc )  - [place] fall precautions as indicated by assessment  - Record patient progress and toleration of activity level on Mobility SBAR; progress patient to next Phase/Stage  - Instruct patient to call for assistance with activity based on assessment  - Consider rehabilitation consult to assist with strengthening/weightbearing, etc   8/16/2019 1800 by Jacquelin Beyer RN  Outcome: Progressing  8/16/2019 1004 by Jacquelin Beyer RN  Outcome: Progressing     Problem: DISCHARGE PLANNING  Goal: Discharge to home or other facility with appropriate resources  Description  INTERVENTIONS:  - Identify barriers to discharge w/patient and caregiver  - Arrange for needed discharge resources and transportation as appropriate  - Identify discharge learning needs (meds, wound care, etc )  - Arrange for interpretive services to assist at discharge as needed  - Refer to Case Management Department for coordinating discharge planning if the patient needs post-hospital services based on physician/advanced practitioner order or complex needs related to functional status, cognitive ability, or social support system  8/16/2019 1800 by Jacquelin Beyer RN  Outcome: Progressing  8/16/2019 1004 by Jacquelin Beyer RN  Outcome: Progressing

## 2019-08-16 NOTE — L&D DELIVERY NOTE
DELIVERY NOTE  Patricio Mcdaniel 29 y o  female MRN: [id number]  Unit/Bed#: -01 Encounter: [id number]      Obstetrician:  Dr Michael Padilla  Assistant: None  Pre-Delivery Diagnosis:   3 29year old  @ 39w1d  2  Induction of labor A2GDM    Post-Delivery Diagnosis:   3 29year old  s/p  with repair of 2nd degree laceration    Procedure: , Repair of 2nd degree laceration  Indications for instrumental delivery: none  Estimated Blood Loss: See QBL  Complications:  none  Specimens: Cord, blood, gasses  Description of Delivery: Patient delivered a viable male      With the assistance of maternal expulsive efforts and downward traction of fetal head, the anterior shoulder was delivered  The same manner was applied for the posterior shoulder but with upward traction  After delivery of the , the umbilical cord was doubly clamped and cut and the  was passed off to  staff for routine care  Umbilical cord blood and umbilical artery and venous gases were collected  Placenta was delivered with fundal massage and gentle traction on the cord  Placenta delivered intact with a central 3-vessel cord  Active management of the third stage of labor was undertaken with IV pitocin  Bleeding was noted to be under control  Inspection of the perineum, vagina, labia, and urethra revealed 2nd degree laceration which was then repaired in standard fashion with 3-0 Vicryl rapid and 2-0 vicryl for repair of the perineal body  The lacerations showed good tissue reapproximation and hemostasis  Mother and baby are currently recovering nicely in stable condition  All needles, instruments, sponges were accounted for x 2     Recent Results (from the past 6 hour(s))   Fingerstick Glucose (POCT)    Collection Time: 19 10:29 AM   Result Value Ref Range    POC Glucose 78 65 - 140 mg/dl   Fingerstick Glucose (POCT)    Collection Time: 19 12:00 PM   Result Value Ref Range    POC Glucose 78 65 - 140 mg/dl   Blood gas, venous, cord    Collection Time: 08/16/19  3:26 PM   Result Value Ref Range    pH, Cord Clive 7 344 7 190 - 7 490    pCO2, Cord Clive 41 8 27 0 - 43 0 mm HG    pO2, Cord Clive 32 8 15 0 - 45 0 mm HG    HCO3, Cord Clive 22 2 12 2 - 28 6 mmol/L    Base Exc, Cord Clive -3 3 (L) 1 0 - 9 0 mmol/L    O2 Cont, Cord Clive 18 5 mL/dL    O2 HGB,VENOUS CORD 75 9 %   Blood gas, arterial, cord    Collection Time: 08/16/19  3:26 PM   Result Value Ref Range    pH, Cord Art 7 328 7 230 - 7 430    pCO2, Cord Art 44 8 30 0 - 60 0    pO2, Cord Art 34 3 (H) 5 0 - 25 0 mm HG    HCO3, Cord Art 23 0 17 3 - 27 3 mmol/L    Base Exc, Cord Art -3 1 (L) 3 0 - 11 0 mmol/L    O2 Content, Cord Art 18 5 ml/dl    O2 Hgb, Arterial Cord 77 7 %

## 2019-08-16 NOTE — OB LABOR/OXYTOCIN SAFETY PROGRESS
Oxytocin Safety Progress Check Note - [facility] 29 y o  female MRN: [id number]    Unit/Bed#: -01 Encounter: [id number]    OB History    Para Term  AB Living   2 1 1 0 0 1   SAB TAB Ectopic Multiple Live Births   0 0 0 0 1     Gestational Age: 39w1d  Dose (ted-units/min) Oxytocin: 16 ted-units/min  Contraction Frequency (minutes): 2-3  Contraction Quality: Moderate  Tachysystole: No   Dilation: 7        Effacement (%): 80  Station: 0  Baseline Rate: 125 bpm  Fetal Heart Rate: 130 BPM  FHR Category: Category I          Notes/comments:      Patient has dilated well, descent is unchanged  Continue with pitocin titration         Stefano Avilez MD 2019 2:00 PM

## 2019-08-16 NOTE — ANESTHESIA PREPROCEDURE EVALUATION
Review of Systems/Medical History  Patient summary reviewed  Chart reviewed  No history of anesthetic complications     Cardiovascular  Negative cardio ROS Exercise tolerance (METS): >4,     Pulmonary  Negative pulmonary ROS        GI/Hepatic  Negative GI/hepatic ROS          Negative  ROS        Endo/Other  Diabetes gestational ,      GYN  Currently pregnant ,          Hematology  Negative hematology ROS      Musculoskeletal  Negative musculoskeletal ROS        Neurology  Negative neurology ROS      Psychology   Negative psychology ROS          Patient is a 28 y/o  at  36 2 with PMH of gestational DM, for IOL for gestational Dm, requesting labor analgesia  Plts 189  Physical Exam    Airway  Comment: Limited mouth opening  Mallampati score: II  TM Distance: >3 FB  Neck ROM: full     Dental   No notable dental hx     Cardiovascular  Comment: Negative ROS, Rhythm: regular, Rate: normal, Cardiovascular exam normal    Pulmonary  Pulmonary exam normal Breath sounds clear to auscultation,     Other Findings        Anesthesia Plan  ASA Score- 2     Anesthesia Type- epidural with ASA Monitors  Additional Monitors:   Airway Plan:         Plan Factors- Instructed to abstain from smoking on day of procedure: N/A  Frederick Perez Patient smoked on day of surgery: N/A  Frederick Perez Induction-     Postoperative Plan-     Informed Consent- Anesthetic plan and risks discussed with patient  I personally reviewed this patient with the CRNA  Discussed and agreed on the Anesthesia Plan with the WON Perez

## 2019-08-16 NOTE — OB LABOR/OXYTOCIN SAFETY PROGRESS
Oxytocin Safety Progress Check Note - Negro Snowball 29 y o  female MRN: [id number]    Unit/Bed#: -01 Encounter: [id number]    OB History    Para Term  AB Living   2 1 1 0 0 1   SAB TAB Ectopic Multiple Live Births   0 0 0 0 1     Gestational Age: 39w1d  Dose (ted-units/min) Oxytocin: 10 ted-units/min  Contraction Frequency (minutes): 3  Contraction Quality: Moderate  Tachysystole: No   Dilation: 5        Effacement (%): 80  Station: 0  Baseline Rate: 120 bpm  Fetal Heart Rate: 130 BPM  FHR Category: Category I          Notes/comments:      AROM clear fluid  S/p Epidural    Continue to monitor       Sada Alvarez MD 2019 12:12 PM

## 2019-08-16 NOTE — DISCHARGE INSTRUCTIONS
Self Care After Delivery   AMBULATORY CARE:   The postpartum period  is the period of time from delivery to about 6 weeks  During this time you may experience many physical and emotional changes  It is important to understand what is normal and when you need to call your healthcare provider  It is also important to know how to care for yourself during this time  Call 911 for any of the following:   · You soak through 1 pad in 15 minutes, have blurry vision, clammy or pale skin, and feel faint  · You faint or lose consciousness  · Your heart is beating faster than normal      · You have trouble breathing  · You cough up blood  Seek care immediately if:   · You soak through 1 or more pads in an hour, or pass blood clots larger than a quarter from your vagina  · Your leg is painful, red, and larger than normal      · You have severe abdominal pain  · You have a bad headache or changes in your vision  · Your episiotomy or C section incision is red, swollen, bleeding, or draining pus  · Your incision comes apart  · You see or hear things that are not there, or have thoughts of harming yourself or your baby  Contact your obstetrician or midwife if:   · You have a fever  · You have new or worsening pain in your abdomen or vagina  · You continue to have the baby blues 10 days after you deliver  · You have trouble sleeping  · You have foul-smelling discharge from your vagina  · You have pain or burning when you urinate  · You do not have a bowel movement for 3 days or more  · You have nausea or are vomiting  · You have hard lumps or red streaks over your breasts  · You have cracked nipples or bleed from your nipples  · You have questions or concerns about your condition or care  Physical changes:   The following are normal changes after you give birth:  · Pain in the area between your anus and vagina    · Breast pain    · Constipation or hemorrhoids    · Hot or cold flashes    · Vaginal bleeding or discharge    · Mild to moderate abdominal cramping    · Difficulty controlling bowel movements or urine  Emotional changes: The following are symptoms of the baby blues, or normal emotions after you give birth  The changes in your emotions may be caused by a drop in hormone levels after you deliver  If these symptoms last longer than 1 to 2 weeks after you give birth, you may have postpartum depression  · Feeling irritable    · Feeling sad    · Crying for no reason    · Feeling anxious  Breast care for nursing mothers: You may have sore breasts for 3 to 6 days after you give birth  This happens as your milk begins to fill your breasts  You may also have sore breasts if you do not breastfeed frequently  Do the following to care for your breasts:  · Apply a moist, warm, compress to your breast as directed  This may help soothe your breasts  Make sure the washcloth is not too hot before you apply it to your breast      · Nurse your baby or pump your milk frequently  This may prevent clogged milk ducts  Ask your healthcare provider how often to nurse or pump  · Massage your breasts as directed  This may help increase your milk flow  Gently rub your breasts in a circular motion before you breastfeed  You may need to gently squeeze your breast or nipple to help release milk  You can also use a breast pump to help release milk from your breast      · Wash your breasts with warm water only  Do not put soap on your nipples  Soap may cause your nipples to become dry  · Apply lanolin cream to your nipples as directed  Lanolin cream may add moisture to your skin and prevent nipple dryness  Always  wash off lanolin cream with warm water before you breastfeed  · Place pads in your bra  Your nipples may leak milk when you are not breastfeeding  You can place pads inside of your bra to help prevent leaking onto your clothing   Ask your healthcare provider where to purchase bra pads  · Get breastfeeding support if needed  There are healthcare providers who can answer questions about breastfeeding and provide you with support  Ask your healthcare provider who you can contact if you need breastfeeding support  Breast care for non-nursing mothers:  Milk will fill your breasts even if you bottle feed your baby  Do the following to help stop your milk from filling your breasts and causing pain:  · Wear a bra with support at all times  A sports bra or a tight-fitting bra will help stop your milk from coming in  · Apply ice on each breast for 15 to 20 minutes every hour or as directed  Use an ice pack, or put crushed ice in a plastic bag  Cover it with a towel  Ice helps your milk ducts shrink  · Keep your breasts away from warm water  Warm water will make it easier for milk to fill your breasts  Stand with your breasts away from warm water in the shower  · Limit how much you touch your breasts  This will prevent them from filling with milk  Perineum care: Your perineum is the area between your rectum and vagina  It is normal to have swelling and pain in this area after you give birth  If you had an episiotomy, your healthcare provider may give you special instructions  · Clean your perineum after you use the bathroom  This may prevent infection and help with healing  Use a spray bottle with warm water to clean your perineum  You may also gently spray warm water against your perineum when you urinate  Always wipe front to back  · Take a sitz bath as directed  A sitz bath may help relieve swelling and pain  Fill your bath tub or bucket with water up to your hips and sit in the water  Use cold water for 2 days after you deliver  Then use warm water  Ask your healthcare provider for more information about a sitz bath  · Apply ice packs for the first 24 hours or as directed  Use a plastic glove filled with ice or buy an ice pack   Wrap the ice pack or plastic glove in a small towel or wash cloth  Place the ice pack on your perineum for 20 minutes at a time  · Sit on a donut-shaped pillow  This may relieve pressure on your perineum when you sit  · Use wipes with medicine or take pills as directed  Your healthcare provider may tell you to use witch hazel pads  You can place witch hazel pads in the refrigerator before you apply them to your perineum  He may also tell you to take NSAIDs  Ask your healthcare provider how often to take pills or use wipes with medicine  · Do not go swimming or take tub baths for 4 to 6 weeks or as directed  This will help prevent an infection in your vagina or uterus  Bowel and bladder care: It may take 3 to 5 days to have a bowel movement after you deliver your baby  You can do the following to prevent or manage constipation, and get control of your bowel or bladder:  · Take stool softeners as directed  A stool softener is medicine that will make your bowel movements softer  This may prevent or relieve constipation  A stool softener may also make bowel movements less painful  · Drink plenty of liquids  Ask how much liquid to drink each day and which liquids are best for you  Liquids may help prevent constipation  · Eat foods high in fiber  Examples include fruits, vegetables, grains, beans, and lentils  Ask your healthcare provider how much fiber you need each day  Fiber may prevent constipation  · Do Kegel exercises as directed  Kegel exercises will help strengthen the muscles that control bowel movements and urination  Ask your healthcare provider for more information on Kegel exercises  · Apply cold compresses or medicine to hemorrhoids as directed  This may relieve swelling and pain  Your healthcare provider may tell you to apply ice or wipes with medicine to your hemorrhoids  He may also tell you to use a sitz bath   Ask your healthcare for more information on how to manage hemorrhoids  Nutrition:  Good nutrition is important in the postpartum period  It will help you return to a healthy weight, increase your energy levels, and prevent constipation  It will also help you get enough nutrients and calories if you are going to breastfeed your baby  · Eat a variety of healthy foods  Healthy foods include fruits, vegetables, whole-grain breads, low-fat dairy products, beans, lean meats, and fish  You may need 500 to 700 additional calories each day if you breastfeed your baby  You may also need extra protein  · Limit foods with added sugar and high amounts of fat  These foods are high in calories and low in healthy nutrients  Read food labels so you know how much sugar and fat is in the food you want to eat  · Drink 8 to 10 glasses of water per day  Water will help you make plenty of milk for your baby  It will also help prevent constipation  Drink a glass of water every time you breastfeed your baby  · Take vitamins as directed  Ask your healthcare provider what vitamins you need  · Limit caffeine and alcohol if you are breastfeeding  Caffeine and alcohol can get into your breast milk  Caffeine and alcohol can make your baby fussy  They can also interfere with your baby's sleep  Ask your healthcare provider if you can drink alcohol or caffeine  Rest and sleep: You may feel very tired in the postpartum period  Enough sleep will help you heal and give you energy to care for your baby  The following may help you get sleep and rest:  · Nap when your baby naps  Your baby may nap several times during the day  Get rest during this time  · Limit visitors  Many people may want to see you and your baby  Ask friends or family to visit on different days  This will give you time to rest      · Do not plan too much for one day  Put off household chores so that you have time to rest  Gradually do more each day  · Ask for help from family, friends, or neighbors    Ask them to help you with laundry, cleaning, or errands  Also ask someone to watch the baby while you take a nap or relax  Ask your partner to help with the care of your baby  Pump some of your breast milk so your partner can feed your baby during the night  Exercise after delivery:  Wait until your healthcare provider says it is okay to exercise  Exercise can help you lose weight, increase your energy levels, and manage your mood  It can also prevent constipation and blood clots  Start with gentle exercises such as walking  Do more as you have more energy  You may need to avoid abdominal exercises for 1 to 2 weeks after you deliver  Talk to your healthcare provider about an exercise plan that is right for you  Sexual activity after delivery:   · Do not have sex until your healthcare provider says it is okay  You may need to wait 4 to 6 weeks before you have sex  This may prevent infection and allow time to heal      · Your menstrual cycle may begin as soon as 3 weeks after you deliver  Your period may be delayed if you breastfeed your baby  You can become pregnant before you get your first postpartum period  Talk to your healthcare provider about birth control that is right for you  Some types of birth control are not safe during breastfeeding  For support and more information:  Join a support group for new mothers  Ask for help from family and friends with chores, errands, and care of your baby  · Office of Women's Health, [place] Department of Health and Human Services  [address], [id number] [place] [address]  [address], [address] [place] [address]  Phone: 0- [phone] - [phone]  Web Address: www womenshealth gov  · [facility] Postpartum [address] [facility] , [address] [place] [address]  [address]  Web Address: [email or web address]-care  aspx  Follow up with your obstetrician or midwife as directed:   You will need to follow up with your healthcare provider in 2 to 6 weeks after delivery  Write down your questions so you remember to ask them at your visits  © 2017 [address] Information is for End User's use only and may not be sold, redistributed or otherwise used for commercial purposes  All illustrations and images included in CareNotes® are the copyrighted property of A D A M , Inc  or Billy Watkins  The above information is an  only  It is not intended as medical advice for individual conditions or treatments  Talk to your doctor, nurse or pharmacist before following any medical regimen to see if it is safe and effective for you

## 2019-08-16 NOTE — H&P
Tanvi 29 y o  female MRN: [id number]  Unit/Bed#: -01 Encounter: [id number]    Assessment: 29 y o  Jose Alexander at 39w1d admitted for induction of labor for A2GDM  SVE: 3/70/-1  FHT: 125bpm  Clinical EFW: 7 ; Vertex confirmed by ultrasound  GBS status: negative       Plan:   · Admit  · CBC, RPR, Type & Screen  · Analgesia at maternal request  · Pitocin titration per protocol  · Clear liquid Diet  · NS IV fluids    Dr Cesilia Calvillo aware      Chief Complaint: induction of labor    HPI: Laurent Mcdaniel is a 29 y o  Jose Alexander with an OLIVER of 2019, by Last Menstrual Period at 36w3d who is being admitted for induction of labor  She complains of uterine contractions, irregularly, has no LOF, and reports no VB  She states she has felt good FM  Ilya Mckenna Patient Active Problem List   Diagnosis    Supervision of other normal pregnancy, antepartum    Insulin controlled gestational diabetes mellitus (GDM) in third trimester    39 weeks gestation of pregnancy       Baby complications/comments: none    Review of Systems   Constitutional: Negative for chills and fever  Respiratory: Negative for cough and choking  Cardiovascular: Negative for chest pain  Gastrointestinal: Negative for abdominal pain and nausea  Genitourinary: Negative for dysuria, urgency, vaginal bleeding and vaginal discharge  Neurological: Negative for dizziness, weakness, light-headedness and headaches         OB History    Para Term  AB Living   2 1 1     1   SAB TAB Ectopic Multiple Live Births         0 1      # Outcome Date GA Lbr Zackery/2nd Weight Sex Delivery Anes PTL Lv   2 Current            1 Term 17 38w2d 06:18 / 431:07 3204 g (7 lb 1 oz) M Vag-Spont EPI N ALEX      Complications: Chorioamnionitis       Past Medical History:   Diagnosis Date    Diabetes mellitus ([facility] [address] )     a2    Oral infection     CHRONIC PROBLEM WITH IMPACTED TEETH    Varicella     vaccine       Past Surgical History: Procedure Laterality Date    VAGINAL DELIVERY  2017       Social History     Tobacco Use    Smoking status: Former Smoker     Last attempt to quit: 2018     Years since quittin 6    Smokeless tobacco: Never Used   Substance Use Topics    Alcohol use: No     Alcohol/week: 0 0 standard drinks     Comment: SOCIAL-NOT DURING PREG       No Known Allergies    Medications Prior to Admission   Medication    calcium carbonate (TUMS) 500 mg chewable tablet    Insulin Pen Needle (PEN NEEDLES) 32G X 4 MM MISC    LEVEMIR FLEXTOUCH 100 units/mL injection pen    ONETOUCH DELICA LANCETS FINE MISC    ONETOUCH VERIO test strip    Prenatal Vit-Fe Fumarate-FA (PRENATAL VITAMIN PO)       Objective:  Temp:  [98 4 °F (36 9 °C)] 98 4 °F (36 9 °C)  HR:  [85] 85  BP: (114)/(71) 114/71  There is no height or weight on file to calculate BMI  Physical Exam:  Physical Exam   Constitutional: She appears well-developed and well-nourished  Cardiovascular: Normal rate, regular rhythm and normal heart sounds  Exam reveals no gallop and no friction rub  No murmur heard  Pulmonary/Chest: Effort normal and breath sounds normal  No respiratory distress  She has no wheezes  Abdominal: Soft  There is no tenderness  Musculoskeletal: She exhibits no tenderness          SVE:  Dilation: 3  Effacement (%): 70  Station: -1    FHT:  Baseline Rate: 125 bpm  Variability: Moderate 6-25 bpm  Accelerations: 15 x 15 or greater  Decelerations: None  FHR Category: Category I    TOCO:   Contraction Frequency (minutes): 3-5  Contraction Duration (seconds): 45  Contraction Quality: Not applicable    Lab Results   Component Value Date    WBC 8 81 2019    HGB 12 5 2019    HCT 37 5 2019     2019     Lab Results   Component Value Date    K 4 5 2019     2019    CO2 23 2019    BUN 18 2019    CREATININE 0 75 2019    AST 11 2019    ALT 10 2019     Prenatal Labs: Reviewed Blood type: A Positive  Antibody: pending  GBS: negative  HIV:negative  Rubella: Immune  VDRL/RPR: Non reactive  HBsAg: Negative  Chlamydia: Negative  Gonorrhea: Negative  Glucose Fastin   Platelets: 716  Hgb: 12 5  >2 Midnights  INPATIENT     Signature/Title:  Abril Nava MD  Date: 2019  Time: 9:00 AM

## 2019-08-16 NOTE — OB LABOR/OXYTOCIN SAFETY PROGRESS
Oxytocin Safety Progress Check Note - Darrell Mares 29 y o  female MRN: [id number]    Unit/Bed#: -01 Encounter: [id number]    OB History    Para Term  AB Living   2 1 1 0 0 1   SAB TAB Ectopic Multiple Live Births   0 0 0 0 1     Gestational Age: 39w1d  Dose (ted-units/min) Oxytocin: 10 ted-units/min  Contraction Frequency (minutes): 2-3  Contraction Quality: Not applicable  Tachysystole: No   Dilation: 4        Effacement (%): 70  Station: -1  Baseline Rate: 120 bpm  Fetal Heart Rate: 130 BPM  FHR Category: Category I          Notes/comments: Patient just received epidural at 11:45  She was ruptured for clear  Discussed w/ Attending              Manuel Call MD 2019 12:08 PM

## 2019-08-17 PROCEDURE — 99024 POSTOP FOLLOW-UP VISIT: CPT | Performed by: OBSTETRICS & GYNECOLOGY

## 2019-08-17 RX ORDER — CALCIUM CARBONATE 200(500)MG
500 TABLET,CHEWABLE ORAL DAILY PRN
Status: DISCONTINUED | OUTPATIENT
Start: 2019-08-17 | End: 2019-08-18 | Stop reason: HOSPADM

## 2019-08-17 RX ADMIN — DOCUSATE SODIUM 100 MG: 100 CAPSULE, LIQUID FILLED ORAL at 16:48

## 2019-08-17 RX ADMIN — IBUPROFEN 600 MG: 600 TABLET ORAL at 23:16

## 2019-08-17 RX ADMIN — IBUPROFEN 600 MG: 600 TABLET ORAL at 16:47

## 2019-08-17 RX ADMIN — DOCUSATE SODIUM 100 MG: 100 CAPSULE, LIQUID FILLED ORAL at 09:00

## 2019-08-17 RX ADMIN — CALCIUM CARBONATE (ANTACID) CHEW TAB 500 MG 500 MG: 500 CHEW TAB at 23:16

## 2019-08-17 RX ADMIN — OXYCODONE HYDROCHLORIDE AND ACETAMINOPHEN 1 TABLET: 5; 325 TABLET ORAL at 02:12

## 2019-08-17 RX ADMIN — IBUPROFEN 600 MG: 600 TABLET ORAL at 04:30

## 2019-08-17 NOTE — LACTATION NOTE
This note was copied from a baby's chart  Mom states infant is feeding much better today  Discussed ways to encourage infant to feed longer at breast  Observed mom placing infant on breast in cradle hold  Nipples flat but mom appropriately latching infant on at this time  Reviewed signs of proper positioning an latch  Encouraged mom to call for additional assistance as needed

## 2019-08-17 NOTE — PLAN OF CARE
Problem: PAIN - ADULT  Goal: Verbalizes/displays adequate comfort level or baseline comfort level  Description  Interventions:  - Encourage patient to monitor pain and request assistance  - Assess pain using appropriate pain scale  - Administer analgesics based on type and severity of pain and evaluate response  - Implement non-pharmacological measures as appropriate and evaluate response  - Consider cultural and social influences on pain and pain management  - Notify physician/advanced practitioner if interventions unsuccessful or patient reports new pain  Outcome: Progressing     Problem: SAFETY ADULT  Goal: Patient will remain free of falls  Description  INTERVENTIONS:  - Assess patient frequently for physical needs  -  Identify cognitive and physical deficits and behaviors that affect risk of falls    -  Story fall precautions as indicated by assessment   - Educate patient/family on patient safety including physical limitations  - Instruct patient to call for assistance with activity based on assessment  - Modify environment to reduce risk of injury  - Consider OT/PT consult to assist with strengthening/mobility  Outcome: Progressing  Goal: Maintain or return to baseline ADL function  Description  INTERVENTIONS:  -  Assess patient's ability to carry out ADLs; assess patient's baseline for ADL function and identify physical deficits which impact ability to perform ADLs (bathing, care of mouth/teeth, toileting, grooming, dressing, etc )  - Assess/evaluate cause of self-care deficits   - Assess range of motion  - Assess patient's mobility; develop plan if impaired  - Assess patient's need for assistive devices and provide as appropriate  - Encourage maximum independence but intervene and supervise when necessary  - Involve family in performance of ADLs  - Assess for home care needs following discharge   - Consider OT consult to assist with ADL evaluation and planning for discharge  - Provide patient education as appropriate  Outcome: Progressing  Goal: Maintain or return mobility status to optimal level  Description  INTERVENTIONS:  - Assess patient's baseline mobility status (ambulation, transfers, stairs, etc )    - Identify cognitive and physical deficits and behaviors that affect mobility  - Identify mobility aids required to assist with transfers and/or ambulation (gait belt, sit-to-stand, lift, walker, cane, etc )  - Nursery fall precautions as indicated by assessment  - Record patient progress and toleration of activity level on Mobility SBAR; progress patient to next Phase/Stage  - Instruct patient to call for assistance with activity based on assessment  - Consider rehabilitation consult to assist with strengthening/weightbearing, etc   Outcome: Progressing     Problem: DISCHARGE PLANNING  Goal: Discharge to home or other facility with appropriate resources  Description  INTERVENTIONS:  - Identify barriers to discharge w/patient and caregiver  - Arrange for needed discharge resources and transportation as appropriate  - Identify discharge learning needs (meds, wound care, etc )  - Arrange for interpretive services to assist at discharge as needed  - Refer to Case Management Department for coordinating discharge planning if the patient needs post-hospital services based on physician/advanced practitioner order or complex needs related to functional status, cognitive ability, or social support system  Outcome: Progressing     Problem: POSTPARTUM  Goal: Experiences normal postpartum course  Description  INTERVENTIONS:  - Monitor maternal vital signs  - Assess uterine involution and lochia  Outcome: Progressing  Goal: Appropriate maternal -  bonding  Description  INTERVENTIONS:  - Identify family support  - Assess for appropriate maternal/infant bonding   -Encourage maternal/infant bonding opportunities  - Referral to  or  as needed  Outcome: Progressing  Goal: Establishment of infant feeding pattern  Description  INTERVENTIONS:  - Assess breast/bottle feeding  - Refer to lactation as needed  Outcome: Progressing

## 2019-08-17 NOTE — LACTATION NOTE
This note was copied from a baby's chart  CONSULT - LACTATION  Baby Boy Gurvinder Courtney) Van[place] 0 days male MRN: [id number]    [facility] Room / Bed: (N)/(N) Encounter: [id number]    Maternal Information     MOTHER:  Bela Cisneros  Maternal Age: 29 y o    OB History: #: 1, Date: 17, Sex: Male, Weight: 3204 g (7 lb 1 oz), GA: 38w2d, Delivery: Vaginal, Spontaneous, Apgar1: 8, Apgar5: 9, Living: Living, Birth Comments: None    #: 2, Date: None, Sex: None, Weight: None, GA: None, Delivery: None, Apgar1: None, Apgar5: None, Living: None, Birth Comments: None   Previouse breast reduction surgery? No    Lactation history:   Has patient previously breast fed: Yes   How long had patient previously breast fed: 2 months with formula   Previous breast feeding complications: Low milk supply     Past Surgical History:   Procedure Laterality Date    VAGINAL DELIVERY         Birth information:  YOB: 2019   Time of birth: 3:22 PM   Sex: male   Delivery type: Vaginal, Spontaneous   Birth Weight: 3430 g (7 lb 9 oz)   Percent of Weight Change: 0%     Gestational Age: 36w3d   [unfilled]    Assessment     Breast and nipple assessment: normal assessment     Assessment: normal assessment    Feeding assessment: feeding well  LATCH:  Latch: Repeated attempts, hold nipple in mouth, stimulate to suck   Audible Swallowing: A few with stimulation   Type of Nipple: Everted (After stimulation)   Comfort (Breast/Nipple): Soft/non-tender   Hold (Positioning): Full assist, staff holds infant at breast   LATCH Score: 6          Feeding recommendations:  breast feed on demand     Discussed 2nd night syndrome and ways to calm infant  Hand out given  Information on hand expression given   Discussed benefits of knowing how to manually express breast including stimulating milk supply, softening nipple for latch and evacuating breast in the event of engorgement  Met with mother  Provided mother with Ready, Set, Baby booklet  Discussed Skin to Skin contact an benefits to mom and baby  Talked about the delay of the first bath until baby has adjusted  Spoke about the benefits of rooming in  Feeding on cue and what that means for recognizing infant's hunger  Avoidance of pacifiers for the first month discussed  Talked about exclusive breastfeeding for the first 6 months  Positioning and latch reviewed as well as showing images of other feeding positions  Discussed the properties of a good latch in any position  Reviewed hand/manual expression  Discussed s/s that baby is getting enough milk and some s/s that breastfeeding dyad may need further help  Gave information on common concerns, what to expect the first few weeks after delivery, preparing for other caregivers, and how partners can help  Resources for support also provided  Worked on positioning infant up at chest level and starting to feed infant with nose arriving at the nipple  Then, using areolar compression to achieve a deep latch that is comfortable and exchanges optimum amounts of milk  Encouraged parents to call for assistance, questions, and concerns about breastfeeding  Extension provided    Kori Rodriguez RN 8/16/2019 8:06 PM

## 2019-08-17 NOTE — PROGRESS NOTES
Progress Note - OB/GYN   Riaz Jurado 29 y o  female MRN: [id number]  Unit/Bed#:  317-01 Encounter: [id number]    Assessment:  Post partum Day #1 s/p , stable, baby in nursery    Plan:  1) Past hx of marijuana use (2018)   -CM consult    2)Continue routine post partum care   Encourage ambulation   Encourage breastfeeding   Anticipate discharge tomorrow     Subjective/Objective   Chief Complaint:     Post delivery  Patient is doing well  Lochia WNL  Pain well controlled  Subjective:     Pain: yes, cramping, improved with meds  Tolerating PO: yes  Voiding: yes  Flatus: yes  BM: no  Ambulating: yes  Breastfeeding:  yes  Chest pain: no  Shortness of breath: no  Leg pain: no  Lochia: minimal    Objective:     Vitals: /72 (BP Location: Left arm)   Pulse 76   Temp 98 2 °F (36 8 °C) (Oral)   Resp 18   LMP 11/15/2018 (Exact Date)   SpO2 98%   Breastfeeding? Yes       Intake/Output Summary (Last 24 hours) at 2019 0331  Last data filed at 2019  Gross per 24 hour   Intake 990 ml   Output 1758 ml   Net -768 ml       Lab Results   Component Value Date    WBC 8 81 2019    HGB 12 5 2019    HCT 37 5 2019    MCV 93 2019     2019       Physical Exam:     Gen: AAOx3, NAD  CV: RRR  Lungs: CTA b/l  Abd: Soft, non-tender, non-distended, no rebound or guarding  Uterine fundus firm and non-tender, 1 cm below the umbilicus     Ext: Non tender    Erick oDherty MD  2019  3:31 AM

## 2019-08-17 NOTE — PLAN OF CARE
Problem: PAIN - ADULT  Goal: Verbalizes/displays adequate comfort level or baseline comfort level  Description  Interventions:  - Encourage patient to monitor pain and request assistance  - Assess pain using appropriate pain scale  - Administer analgesics based on type and severity of pain and evaluate response  - Implement non-pharmacological measures as appropriate and evaluate response  - Consider cultural and social influences on pain and pain management  - Notify physician/advanced practitioner if interventions unsuccessful or patient reports new pain  Outcome: Progressing     Problem: SAFETY ADULT  Goal: Patient will remain free of falls  Description  INTERVENTIONS:  - Assess patient frequently for physical needs  -  Identify cognitive and physical deficits and behaviors that affect risk of falls    -  [place] fall precautions as indicated by assessment   - Educate patient/family on patient safety including physical limitations  - Instruct patient to call for assistance with activity based on assessment  - Modify environment to reduce risk of injury  - Consider OT/PT consult to assist with strengthening/mobility  Outcome: Progressing  Goal: Maintain or return to baseline ADL function  Description  INTERVENTIONS:  -  Assess patient's ability to carry out ADLs; assess patient's baseline for ADL function and identify physical deficits which impact ability to perform ADLs (bathing, care of mouth/teeth, toileting, grooming, dressing, etc )  - Assess/evaluate cause of self-care deficits   - Assess range of motion  - Assess patient's mobility; develop plan if impaired  - Assess patient's need for assistive devices and provide as appropriate  - Encourage maximum independence but intervene and supervise when necessary  - Involve family in performance of ADLs  - Assess for home care needs following discharge   - Consider OT consult to assist with ADL evaluation and planning for discharge  - Provide patient education as appropriate  Outcome: Progressing  Goal: Maintain or return mobility status to optimal level  Description  INTERVENTIONS:  - Assess patient's baseline mobility status (ambulation, transfers, stairs, etc )    - Identify cognitive and physical deficits and behaviors that affect mobility  - Identify mobility aids required to assist with transfers and/or ambulation (gait belt, sit-to-stand, lift, walker, cane, etc )  - [place] fall precautions as indicated by assessment  - Record patient progress and toleration of activity level on Mobility SBAR; progress patient to next Phase/Stage  - Instruct patient to call for assistance with activity based on assessment  - Consider rehabilitation consult to assist with strengthening/weightbearing, etc   Outcome: Progressing     Problem: DISCHARGE PLANNING  Goal: Discharge to home or other facility with appropriate resources  Description  INTERVENTIONS:  - Identify barriers to discharge w/patient and caregiver  - Arrange for needed discharge resources and transportation as appropriate  - Identify discharge learning needs (meds, wound care, etc )  - Arrange for interpretive services to assist at discharge as needed  - Refer to Case Management Department for coordinating discharge planning if the patient needs post-hospital services based on physician/advanced practitioner order or complex needs related to functional status, cognitive ability, or social support system  Outcome: Progressing     Problem: POSTPARTUM  Goal: Experiences normal postpartum course  Description  INTERVENTIONS:  - Monitor maternal vital signs  - Assess uterine involution and lochia  Outcome: Progressing  Goal: Appropriate maternal -  bonding  Description  INTERVENTIONS:  - Identify family support  - Assess for appropriate maternal/infant bonding   -Encourage maternal/infant bonding opportunities  - Referral to  or  as needed  Outcome: Progressing  Goal: Establishment of infant feeding pattern  Description  INTERVENTIONS:  - Assess breast/bottle feeding  - Refer to lactation as needed  Outcome: Progressing

## 2019-08-18 VITALS
HEART RATE: 78 BPM | RESPIRATION RATE: 18 BRPM | OXYGEN SATURATION: 98 % | DIASTOLIC BLOOD PRESSURE: 70 MMHG | SYSTOLIC BLOOD PRESSURE: 116 MMHG | TEMPERATURE: 98.3 F

## 2019-08-18 PROBLEM — Z34.80 SUPERVISION OF OTHER NORMAL PREGNANCY, ANTEPARTUM: Status: RESOLVED | Noted: 2017-11-02 | Resolved: 2019-08-18

## 2019-08-18 PROCEDURE — 99024 POSTOP FOLLOW-UP VISIT: CPT | Performed by: OBSTETRICS & GYNECOLOGY

## 2019-08-18 RX ORDER — ACETAMINOPHEN 325 MG/1
650 TABLET ORAL EVERY 6 HOURS PRN
Qty: 30 TABLET | Refills: 0 | Status: SHIPPED | OUTPATIENT
Start: 2019-08-18 | End: 2019-09-16

## 2019-08-18 RX ORDER — DOCUSATE SODIUM 100 MG/1
100 CAPSULE, LIQUID FILLED ORAL 2 TIMES DAILY
Qty: 10 CAPSULE | Refills: 0 | Status: SHIPPED | OUTPATIENT
Start: 2019-08-18 | End: 2019-09-16

## 2019-08-18 RX ORDER — IBUPROFEN 600 MG/1
600 TABLET ORAL EVERY 6 HOURS PRN
Qty: 30 TABLET | Refills: 0 | Status: SHIPPED | OUTPATIENT
Start: 2019-08-18 | End: 2019-09-16

## 2019-08-18 RX ADMIN — DOCUSATE SODIUM 100 MG: 100 CAPSULE, LIQUID FILLED ORAL at 09:38

## 2019-08-18 RX ADMIN — IBUPROFEN 600 MG: 600 TABLET ORAL at 09:37

## 2019-08-18 NOTE — PLAN OF CARE
Problem: POSTPARTUM  Goal: Experiences normal postpartum course  Description  INTERVENTIONS:  - Monitor maternal vital signs  - Assess uterine involution and lochia  Outcome: Completed  Goal: Appropriate maternal -  bonding  Description  INTERVENTIONS:  - Identify family support  - Assess for appropriate maternal/infant bonding   -Encourage maternal/infant bonding opportunities  - Referral to  or  as needed  Outcome: Completed  Goal: Establishment of infant feeding pattern  Description  INTERVENTIONS:  - Assess breast/bottle feeding  - Refer to lactation as needed  Outcome: Completed     Problem: PAIN - ADULT  Goal: Verbalizes/displays adequate comfort level or baseline comfort level  Description  Interventions:  - Encourage patient to monitor pain and request assistance  - Assess pain using appropriate pain scale  - Administer analgesics based on type and severity of pain and evaluate response  - Implement non-pharmacological measures as appropriate and evaluate response  - Consider cultural and social influences on pain and pain management  - Notify physician/advanced practitioner if interventions unsuccessful or patient reports new pain  Outcome: Completed     Problem: SAFETY ADULT  Goal: Patient will remain free of falls  Description  INTERVENTIONS:  - Assess patient frequently for physical needs  -  Identify cognitive and physical deficits and behaviors that affect risk of falls    -  [place] fall precautions as indicated by assessment   - Educate patient/family on patient safety including physical limitations  - Instruct patient to call for assistance with activity based on assessment  - Modify environment to reduce risk of injury  - Consider OT/PT consult to assist with strengthening/mobility  Outcome: Completed  Goal: Maintain or return to baseline ADL function  Description  INTERVENTIONS:  -  Assess patient's ability to carry out ADLs; assess patient's baseline for ADL function and identify physical deficits which impact ability to perform ADLs (bathing, care of mouth/teeth, toileting, grooming, dressing, etc )  - Assess/evaluate cause of self-care deficits   - Assess range of motion  - Assess patient's mobility; develop plan if impaired  - Assess patient's need for assistive devices and provide as appropriate  - Encourage maximum independence but intervene and supervise when necessary  - Involve family in performance of ADLs  - Assess for home care needs following discharge   - Consider OT consult to assist with ADL evaluation and planning for discharge  - Provide patient education as appropriate  Outcome: Completed  Goal: Maintain or return mobility status to optimal level  Description  INTERVENTIONS:  - Assess patient's baseline mobility status (ambulation, transfers, stairs, etc )    - Identify cognitive and physical deficits and behaviors that affect mobility  - Identify mobility aids required to assist with transfers and/or ambulation (gait belt, sit-to-stand, lift, walker, cane, etc )  - [place] fall precautions as indicated by assessment  - Record patient progress and toleration of activity level on Mobility SBAR; progress patient to next Phase/Stage  - Instruct patient to call for assistance with activity based on assessment  - Consider rehabilitation consult to assist with strengthening/weightbearing, etc   Outcome: Completed

## 2019-08-18 NOTE — LACTATION NOTE
This note was copied from a baby's chart  Mom states infant has been feeding much better now  Observed infant at breast in cradle hold  Good positioning and latch noted and reviewed  Reviewed expected changes in infant feeding patterns over the next few days, engorgement relief measures, use of feeding log, engorgement relief measures and when and where to call for additional assistance as needed

## 2019-08-18 NOTE — PROGRESS NOTES
Progress Note - OB/GYN   Hernando Willis 29 y o  female MRN: [id number]  Unit/Bed#: -01 Encounter: [id number]    Assessment:  Post partum Day #2 s/p , stable, baby in room   Term pregnancy   Single  Masculine viable fetus  Pregnancy complicated by   - N0TIY,  Follow up in her postpartum visit   - THC use (2018), pending evaluation for CM       Plan:  Continue routine post partum care  Encourage ambulation  Encourage breastfeeding  Anticipate discharge today    Subjective/Objective   Chief Complaint:     Post delivery  Patient is doing well  Lochia WNL  Pain well controlled  Subjective:     Pain: yes, cramping, improved with meds  Tolerating PO: yes  Voiding: yes  Flatus: yes  BM: no  Ambulating: yes  Chest pain: no  Shortness of breath: no  Leg pain: no  Lochia: minimal    Objective:     Vitals: /67   Pulse 62   Temp 98 3 °F (36 8 °C) (Oral)   Resp 18   LMP 11/15/2018 (Exact Date)   SpO2 98%   Breastfeeding? Yes     No intake or output data in the 24 hours ending 19 0619    Lab Results   Component Value Date    WBC 8 81 2019    HGB 12 5 2019    HCT 37 5 2019    MCV 93 2019     2019       Physical Exam:     Gen: AAOx3, NAD  CV: RRR  Lungs: CTA b/l  Abd: Soft, non-tender, non-distended, no rebound or guarding  Uterine fundus firm and non-tender, 1 cm below the umbilicus     Ext: Non tender    Su Kanner, MD  2019  6:19 AM

## 2019-08-18 NOTE — SOCIAL WORK
CM met w/pt @ bedside with introduction and discussion of CM role  Pt reported she lives with  Rachel Santana [phone] who is involved and supportive  Pt has two kids  Has all baby items, good supports and ride home  Denies inpatient MH/D&A treatment  Denies hx C&Y involvement  Reports last used THC prior to pregnancy  Review of chart: pt and baby UDS negative  As there are no additional CM needs or concerns, pt cleared to d/c home with baby  Nursing updated

## 2019-08-18 NOTE — PLAN OF CARE
Problem: PAIN - ADULT  Goal: Verbalizes/displays adequate comfort level or baseline comfort level  Description  Interventions:  - Encourage patient to monitor pain and request assistance  - Assess pain using appropriate pain scale  - Administer analgesics based on type and severity of pain and evaluate response  - Implement non-pharmacological measures as appropriate and evaluate response  - Consider cultural and social influences on pain and pain management  - Notify physician/advanced practitioner if interventions unsuccessful or patient reports new pain  Outcome: Progressing     Problem: SAFETY ADULT  Goal: Patient will remain free of falls  Description  INTERVENTIONS:  - Assess patient frequently for physical needs  -  Identify cognitive and physical deficits and behaviors that affect risk of falls    -  [place] fall precautions as indicated by assessment   - Educate patient/family on patient safety including physical limitations  - Instruct patient to call for assistance with activity based on assessment  - Modify environment to reduce risk of injury  - Consider OT/PT consult to assist with strengthening/mobility  Outcome: Progressing  Goal: Maintain or return to baseline ADL function  Description  INTERVENTIONS:  -  Assess patient's ability to carry out ADLs; assess patient's baseline for ADL function and identify physical deficits which impact ability to perform ADLs (bathing, care of mouth/teeth, toileting, grooming, dressing, etc )  - Assess/evaluate cause of self-care deficits   - Assess range of motion  - Assess patient's mobility; develop plan if impaired  - Assess patient's need for assistive devices and provide as appropriate  - Encourage maximum independence but intervene and supervise when necessary  - Involve family in performance of ADLs  - Assess for home care needs following discharge   - Consider OT consult to assist with ADL evaluation and planning for discharge  - Provide patient education as appropriate  Outcome: Progressing  Goal: Maintain or return mobility status to optimal level  Description  INTERVENTIONS:  - Assess patient's baseline mobility status (ambulation, transfers, stairs, etc )    - Identify cognitive and physical deficits and behaviors that affect mobility  - Identify mobility aids required to assist with transfers and/or ambulation (gait belt, sit-to-stand, lift, walker, cane, etc )  - [place] fall precautions as indicated by assessment  - Record patient progress and toleration of activity level on Mobility SBAR; progress patient to next Phase/Stage  - Instruct patient to call for assistance with activity based on assessment  - Consider rehabilitation consult to assist with strengthening/weightbearing, etc   Outcome: Progressing     Problem: DISCHARGE PLANNING  Goal: Discharge to home or other facility with appropriate resources  Description  INTERVENTIONS:  - Identify barriers to discharge w/patient and caregiver  - Arrange for needed discharge resources and transportation as appropriate  - Identify discharge learning needs (meds, wound care, etc )  - Arrange for interpretive services to assist at discharge as needed  - Refer to Case Management Department for coordinating discharge planning if the patient needs post-hospital services based on physician/advanced practitioner order or complex needs related to functional status, cognitive ability, or social support system  Outcome: Progressing     Problem: POSTPARTUM  Goal: Experiences normal postpartum course  Description  INTERVENTIONS:  - Monitor maternal vital signs  - Assess uterine involution and lochia  Outcome: Progressing  Goal: Appropriate maternal -  bonding  Description  INTERVENTIONS:  - Identify family support  - Assess for appropriate maternal/infant bonding   -Encourage maternal/infant bonding opportunities  - Referral to  or  as needed  Outcome: Progressing  Goal: Establishment of infant feeding pattern  Description  INTERVENTIONS:  - Assess breast/bottle feeding  - Refer to lactation as needed  Outcome: Progressing

## 2019-08-19 ENCOUNTER — TELEPHONE (OUTPATIENT)
Dept: PERINATAL CARE | Facility: CLINIC | Age: 29
End: 2019-08-19

## 2019-08-19 LAB — RPR SER QL: NORMAL

## 2019-08-19 NOTE — UTILIZATION REVIEW
Notification of Maternity Inpatient Admission/Maternity Inpatient Authorization Request  This is a Notification of Maternity Inpatient Admission/Maternity Inpatient Authorization Request to our facility [facility] [address]  Please be advised that this patient is currently in our facility under Inpatient Status  Below you will find the Birth/[place] Summary, Attending Physician and Facilitys information including NPI#  and contact information for the Utilization Review Department where the patient is receiving care services  Facility: [facility] [address]  Address: [address] [place] [address]  Phone: [phone] Tax ID: [id number]  NPI: [id number]  MEDICARE ID: [id number]    Place of Service Code: 24   Place of Service Name: Inpatient Hospital  Presentation Date & Time: 2019  7:26 AM  Inpatient Admission Date & Time: 19 7909  Discharge Date & Time: 2019  3:17 PM   Discharge Disposition (if discharged): Home/Self Care  Attending Physician & NPI: Refugio Matson Md [[id number]]  Attending Physician:  Lewanda Hodgkin Garrison Putty, M D  Specialty- Obstetrics and Gynecology  [facility] ID- [id number]  [address] [place] [address]  Phone 1: [phone]  Fax: [phone]  Mother of [place] Information: Yasmin Blackwood   MRN: [id number] YOB: 1990   Mother's Admitting Diagnosis: 44 weeks gestation of pregnancy [Z3A 39]  Estimated Date of Delivery: 19  Type of Delivery: Vaginal, Spontaneous  Delivering clinician: Eligio   OB History        2    Para   2    Term   2            AB        Living   2       SAB        TAB        Ectopic        Multiple   0    Live Births   2               [place] Name & MRN:   Information for the patient's :  Love Willson [[id number]]     [place] Delivery Information:  Sex: male  Delivered 2019 3:24 PM by Vaginal, Spontaneous;  Gestational Age: 39w1d    [place] Measurements:  Weight: 7 lb 9 oz (3430 g); Height: 20"    APGAR 1 minute 5 minutes 10 minutes   Totals: 9 9      Thank you,  [address] [facility] Review Department  Phone: [phone]; Fax [phone]  ATTENTION: Please call with any questions or concerns to [phone]  and carefully follow the prompts so that you are directed to the right person  Send all requests for admission clinical reviews, approved or denied determinations and any other requests to fax [phone]   All voicemails are confidential

## 2019-08-19 NOTE — TELEPHONE ENCOUNTER
----- Message from Marti Mcgregor MD sent at 8/16/2019  1:12 PM EDT -----  I reviewed the lab study today and the results are normal

## 2019-08-28 LAB — PLACENTA IN STORAGE: NORMAL

## 2019-09-16 ENCOUNTER — POSTPARTUM VISIT (OUTPATIENT)
Dept: OBGYN CLINIC | Facility: CLINIC | Age: 29
End: 2019-09-16

## 2019-09-16 VITALS
BODY MASS INDEX: 27.64 KG/M2 | DIASTOLIC BLOOD PRESSURE: 82 MMHG | HEIGHT: 63 IN | WEIGHT: 156 LBS | SYSTOLIC BLOOD PRESSURE: 132 MMHG

## 2019-09-16 DIAGNOSIS — Z30.013 ENCOUNTER FOR INITIAL PRESCRIPTION OF INJECTABLE CONTRACEPTIVE: ICD-10-CM

## 2019-09-16 DIAGNOSIS — Z86.32 HISTORY OF GESTATIONAL DIABETES: Primary | ICD-10-CM

## 2019-09-16 PROBLEM — Z3A.39 39 WEEKS GESTATION OF PREGNANCY: Status: RESOLVED | Noted: 2019-07-23 | Resolved: 2019-09-16

## 2019-09-16 PROBLEM — O24.414 INSULIN CONTROLLED GESTATIONAL DIABETES MELLITUS (GDM) IN THIRD TRIMESTER: Status: RESOLVED | Noted: 2019-06-17 | Resolved: 2019-09-16

## 2019-09-16 PROCEDURE — 99024 POSTOP FOLLOW-UP VISIT: CPT | Performed by: NURSE PRACTITIONER

## 2019-09-16 RX ORDER — MEDROXYPROGESTERONE ACETATE 150 MG/ML
150 INJECTION, SUSPENSION INTRAMUSCULAR
Qty: 1 ML | Refills: 1 | Status: SHIPPED | OUTPATIENT
Start: 2019-09-16 | End: 2020-03-09 | Stop reason: SDUPTHER

## 2019-09-16 NOTE — PROGRESS NOTES
Postpartum Note  Stephanie David 29 y o  @ [id number]          Subjective: Pleasant 29 y o  here for postpartum exam  EDPS of 9   Baby and mom doing well  Requests breast pump script bc her other pump broke  Past Medical History:   Diagnosis Date    Diabetes mellitus (HCC)     a2    Oral infection     CHRONIC PROBLEM WITH IMPACTED TEETH    Varicella     vaccine     Past Surgical History:   Procedure Laterality Date    VAGINAL DELIVERY  2017     No Known Allergies    Meds:   Current Outpatient Medications:     Prenatal Vit-Fe Fumarate-FA (PRENATAL VITAMIN PO), Take 1 tablet by mouth daily, Disp: , Rfl:     Insulin Pen Needle (PEN NEEDLES) 32G X 4 MM MISC, Use a new needle for each injection at bedtime (Patient not taking: Reported on 9/16/2019), Disp: 90 each, Rfl: 1    medroxyPROGESTERone (DEPO-PROVERA) 150 mg/mL injection, Inject 1 mL (150 mg total) into a muscle every 3 (three) months, Disp: 1 mL, Rfl: 1    ONETOUCH DELICA LANCETS FINE MISC, Test 4 times per day  (Patient not taking: Reported on 9/16/2019), Disp: 100 each, Rfl: 4    Vitals: Blood pressure 132/82, height 5' 3" (1 6 m), weight 70 8 kg (156 lb), last menstrual period 11/15/2018, not currently breastfeeding  ,Body mass index is 27 63 kg/m²  Review of Systems:  CONSTITUTIONALno weight loss, fever, night sweats and feels well  CVS:; pertinent negatives - chest pain, chest pressure/discomfort, dyspnea, irregular heart beat, lower extremity edema and palpitations  RESP:no cough, shortness of breath, or wheezing  GI:Normal BM's, denies hematochezia, melena or pain    :Denies: dysuria, frequency/urgency, hematuria, heavy menses, pelvic pain  VAGINAL BLEEDING:staining only    PHYSICAL EXAM:    GEN: Stephanie David appears well, alert and oriented x 3, pleasant and cooperative   NECK: supple, no thyromegaly  LUNGS: normal respiratory effort   ABDOMEN: soft, nondistended  EXTREMITIES: no edema  NEURO: no focal findings   BREASTS: bilaterally no masses, no nipple discharge, no skin changes, warm and soft bilaterally  GYN: external genitalia wnl, no lesions, no rashes, no erythema            Uterus: normal size, nontender, mobile            Cervix closed, no discharge            Adnexa: nontender, nonpalpable bilaterally                  Assessment: Normal Postpartum Exam  Plan:  Contraceptive method declined were IUD or Implant-would like to try Depo  Would forget ocp    Return for annual in 3 months

## 2019-09-16 NOTE — PATIENT INSTRUCTIONS
Self Care After Delivery   AMBULATORY CARE:   The postpartum period  is the period of time from delivery to about 6 weeks  During this time you may experience many physical and emotional changes  It is important to understand what is normal and when you need to call your healthcare provider  It is also important to know how to care for yourself during this time  Call 911 for any of the following:   · You soak through 1 pad in 15 minutes, have blurry vision, clammy or pale skin, and feel faint  · You faint or lose consciousness  · Your heart is beating faster than normal      · You have trouble breathing  · You cough up blood  Seek care immediately if:   · You soak through 1 or more pads in an hour, or pass blood clots larger than a quarter from your vagina  · Your leg is painful, red, and larger than normal      · You have severe abdominal pain  · You have a bad headache or changes in your vision  · Your episiotomy or C section incision is red, swollen, bleeding, or draining pus  · Your incision comes apart  · You see or hear things that are not there, or have thoughts of harming yourself or your baby  Contact your obstetrician or midwife if:   · You have a fever  · You have new or worsening pain in your abdomen or vagina  · You continue to have the baby blues 10 days after you deliver  · You have trouble sleeping  · You have foul-smelling discharge from your vagina  · You have pain or burning when you urinate  · You do not have a bowel movement for 3 days or more  · You have nausea or are vomiting  · You have hard lumps or red streaks over your breasts  · You have cracked nipples or bleed from your nipples  · You have questions or concerns about your condition or care  Physical changes:   The following are normal changes after you give birth:  · Pain in the area between your anus and vagina    · Breast pain    · Constipation or hemorrhoids    · Hot or cold flashes    · Vaginal bleeding or discharge    · Mild to moderate abdominal cramping    · Difficulty controlling bowel movements or urine  Emotional changes: The following are symptoms of the baby blues, or normal emotions after you give birth  The changes in your emotions may be caused by a drop in hormone levels after you deliver  If these symptoms last longer than 1 to 2 weeks after you give birth, you may have postpartum depression  · Feeling irritable    · Feeling sad    · Crying for no reason    · Feeling anxious  Breast care for nursing mothers: You may have sore breasts for 3 to 6 days after you give birth  This happens as your milk begins to fill your breasts  You may also have sore breasts if you do not breastfeed frequently  Do the following to care for your breasts:  · Apply a moist, warm, compress to your breast as directed  This may help soothe your breasts  Make sure the washcloth is not too hot before you apply it to your breast      · Nurse your baby or pump your milk frequently  This may prevent clogged milk ducts  Ask your healthcare provider how often to nurse or pump  · Massage your breasts as directed  This may help increase your milk flow  Gently rub your breasts in a circular motion before you breastfeed  You may need to gently squeeze your breast or nipple to help release milk  You can also use a breast pump to help release milk from your breast      · Wash your breasts with warm water only  Do not put soap on your nipples  Soap may cause your nipples to become dry  · Apply lanolin cream to your nipples as directed  Lanolin cream may add moisture to your skin and prevent nipple dryness  Always  wash off lanolin cream with warm water before you breastfeed  · Place pads in your bra  Your nipples may leak milk when you are not breastfeeding  You can place pads inside of your bra to help prevent leaking onto your clothing   Ask your healthcare provider where to purchase bra pads  · Get breastfeeding support if needed  There are healthcare providers who can answer questions about breastfeeding and provide you with support  Ask your healthcare provider who you can contact if you need breastfeeding support  Breast care for non-nursing mothers:  Milk will fill your breasts even if you bottle feed your baby  Do the following to help stop your milk from filling your breasts and causing pain:  · Wear a bra with support at all times  A sports bra or a tight-fitting bra will help stop your milk from coming in  · Apply ice on each breast for 15 to 20 minutes every hour or as directed  Use an ice pack, or put crushed ice in a plastic bag  Cover it with a towel  Ice helps your milk ducts shrink  · Keep your breasts away from warm water  Warm water will make it easier for milk to fill your breasts  Stand with your breasts away from warm water in the shower  · Limit how much you touch your breasts  This will prevent them from filling with milk  Perineum care: Your perineum is the area between your rectum and vagina  It is normal to have swelling and pain in this area after you give birth  If you had an episiotomy, your healthcare provider may give you special instructions  · Clean your perineum after you use the bathroom  This may prevent infection and help with healing  Use a spray bottle with warm water to clean your perineum  You may also gently spray warm water against your perineum when you urinate  Always wipe front to back  · Take a sitz bath as directed  A sitz bath may help relieve swelling and pain  Fill your bath tub or bucket with water up to your hips and sit in the water  Use cold water for 2 days after you deliver  Then use warm water  Ask your healthcare provider for more information about a sitz bath  · Apply ice packs for the first 24 hours or as directed  Use a plastic glove filled with ice or buy an ice pack   Wrap the ice pack or plastic glove in a small towel or wash cloth  Place the ice pack on your perineum for 20 minutes at a time  · Sit on a donut-shaped pillow  This may relieve pressure on your perineum when you sit  · Use wipes with medicine or take pills as directed  Your healthcare provider may tell you to use witch hazel pads  You can place witch hazel pads in the refrigerator before you apply them to your perineum  He may also tell you to take NSAIDs  Ask your healthcare provider how often to take pills or use wipes with medicine  · Do not go swimming or take tub baths for 4 to 6 weeks or as directed  This will help prevent an infection in your vagina or uterus  Bowel and bladder care: It may take 3 to 5 days to have a bowel movement after you deliver your baby  You can do the following to prevent or manage constipation, and get control of your bowel or bladder:  · Take stool softeners as directed  A stool softener is medicine that will make your bowel movements softer  This may prevent or relieve constipation  A stool softener may also make bowel movements less painful  · Drink plenty of liquids  Ask how much liquid to drink each day and which liquids are best for you  Liquids may help prevent constipation  · Eat foods high in fiber  Examples include fruits, vegetables, grains, beans, and lentils  Ask your healthcare provider how much fiber you need each day  Fiber may prevent constipation  · Do Kegel exercises as directed  Kegel exercises will help strengthen the muscles that control bowel movements and urination  Ask your healthcare provider for more information on Kegel exercises  · Apply cold compresses or medicine to hemorrhoids as directed  This may relieve swelling and pain  Your healthcare provider may tell you to apply ice or wipes with medicine to your hemorrhoids  He may also tell you to use a sitz bath   Ask your healthcare for more information on how to manage hemorrhoids  Nutrition:  Good nutrition is important in the postpartum period  It will help you return to a healthy weight, increase your energy levels, and prevent constipation  It will also help you get enough nutrients and calories if you are going to breastfeed your baby  · Eat a variety of healthy foods  Healthy foods include fruits, vegetables, whole-grain breads, low-fat dairy products, beans, lean meats, and fish  You may need 500 to 700 additional calories each day if you breastfeed your baby  You may also need extra protein  · Limit foods with added sugar and high amounts of fat  These foods are high in calories and low in healthy nutrients  Read food labels so you know how much sugar and fat is in the food you want to eat  · Drink 8 to 10 glasses of water per day  Water will help you make plenty of milk for your baby  It will also help prevent constipation  Drink a glass of water every time you breastfeed your baby  · Take vitamins as directed  Ask your healthcare provider what vitamins you need  · Limit caffeine and alcohol if you are breastfeeding  Caffeine and alcohol can get into your breast milk  Caffeine and alcohol can make your baby fussy  They can also interfere with your baby's sleep  Ask your healthcare provider if you can drink alcohol or caffeine  Rest and sleep: You may feel very tired in the postpartum period  Enough sleep will help you heal and give you energy to care for your baby  The following may help you get sleep and rest:  · Nap when your baby naps  Your baby may nap several times during the day  Get rest during this time  · Limit visitors  Many people may want to see you and your baby  Ask friends or family to visit on different days  This will give you time to rest      · Do not plan too much for one day  Put off household chores so that you have time to rest  Gradually do more each day  · Ask for help from family, friends, or neighbors    Ask them to help you with laundry, cleaning, or errands  Also ask someone to watch the baby while you take a nap or relax  Ask your partner to help with the care of your baby  Pump some of your breast milk so your partner can feed your baby during the night  Exercise after delivery:  Wait until your healthcare provider says it is okay to exercise  Exercise can help you lose weight, increase your energy levels, and manage your mood  It can also prevent constipation and blood clots  Start with gentle exercises such as walking  Do more as you have more energy  You may need to avoid abdominal exercises for 1 to 2 weeks after you deliver  Talk to your healthcare provider about an exercise plan that is right for you  Sexual activity after delivery:   · Do not have sex until your healthcare provider says it is okay  You may need to wait 4 to 6 weeks before you have sex  This may prevent infection and allow time to heal      · Your menstrual cycle may begin as soon as 3 weeks after you deliver  Your period may be delayed if you breastfeed your baby  You can become pregnant before you get your first postpartum period  Talk to your healthcare provider about birth control that is right for you  Some types of birth control are not safe during breastfeeding  For support and more information:  Join a support group for new mothers  Ask for help from family and friends with chores, errands, and care of your baby  · Office of Women's Health,  Department of Health and Human Services  [address], [id number] [place] [address]  [address], [address] [place] [place]  [place] , [address]  Phone: [phone]  Web Address: www womenshealth gov  · [facility] Postpartum [address] [facility] , [address] [place] [address]  [address]  Web Address: [employer]  org/pregnancy/postpartum-care  aspx  Follow up with your obstetrician or midwife as directed:   You will need to follow up with your healthcare provider in 2 to 6 weeks after delivery  Write down your questions so you remember to ask them at your visits  © 2017 [address] Information is for End User's use only and may not be sold, redistributed or otherwise used for commercial purposes  All illustrations and images included in CareNotes® are the copyrighted property of A D A M , Inc  or Billy Watkins  The above information is an  only  It is not intended as medical advice for individual conditions or treatments  Talk to your doctor, nurse or pharmacist before following any medical regimen to see if it is safe and effective for you

## 2019-09-26 ENCOUNTER — CLINICAL SUPPORT (OUTPATIENT)
Dept: OBGYN CLINIC | Facility: CLINIC | Age: 29
End: 2019-09-26
Payer: COMMERCIAL

## 2019-09-26 VITALS
WEIGHT: 155 LBS | HEIGHT: 63 IN | BODY MASS INDEX: 27.46 KG/M2 | SYSTOLIC BLOOD PRESSURE: 128 MMHG | DIASTOLIC BLOOD PRESSURE: 74 MMHG

## 2019-09-26 DIAGNOSIS — Z30.42 ENCOUNTER FOR DEPO-PROVERA CONTRACEPTION: ICD-10-CM

## 2019-09-26 LAB — SL AMB POCT URINE HCG: NEGATIVE

## 2019-09-26 PROCEDURE — 96372 THER/PROPH/DIAG INJ SC/IM: CPT | Performed by: NURSE PRACTITIONER

## 2019-09-26 PROCEDURE — 81025 URINE PREGNANCY TEST: CPT | Performed by: NURSE PRACTITIONER

## 2019-09-26 RX ORDER — MEDROXYPROGESTERONE ACETATE 150 MG/ML
150 INJECTION, SUSPENSION INTRAMUSCULAR ONCE
Status: COMPLETED | OUTPATIENT
Start: 2019-09-26 | End: 2019-09-26

## 2019-09-26 RX ADMIN — MEDROXYPROGESTERONE ACETATE 150 MG: 150 INJECTION, SUSPENSION INTRAMUSCULAR at 14:15

## 2019-09-26 NOTE — PROGRESS NOTES
Patient is here for 1st dep injection  Administered on left deltoid, patient tolerated injection well  She was instructed to stay for 15 minutes to monitor her  POCT urine HCG was negative  Next due 12/2-26  Patient scheduled

## 2019-12-16 ENCOUNTER — CLINICAL SUPPORT (OUTPATIENT)
Dept: OBGYN CLINIC | Facility: CLINIC | Age: 29
End: 2019-12-16
Payer: COMMERCIAL

## 2019-12-16 VITALS — SYSTOLIC BLOOD PRESSURE: 120 MMHG | WEIGHT: 154.8 LBS | DIASTOLIC BLOOD PRESSURE: 84 MMHG | BODY MASS INDEX: 27.42 KG/M2

## 2019-12-16 DIAGNOSIS — Z30.42 ENCOUNTER FOR DEPO-PROVERA CONTRACEPTION: Primary | ICD-10-CM

## 2019-12-16 PROCEDURE — 96372 THER/PROPH/DIAG INJ SC/IM: CPT | Performed by: STUDENT IN AN ORGANIZED HEALTH CARE EDUCATION/TRAINING PROGRAM

## 2019-12-16 RX ORDER — MEDROXYPROGESTERONE ACETATE 150 MG/ML
150 INJECTION, SUSPENSION INTRAMUSCULAR ONCE
Status: COMPLETED | OUTPATIENT
Start: 2019-12-16 | End: 2019-12-16

## 2019-12-16 RX ADMIN — MEDROXYPROGESTERONE ACETATE 150 MG: 150 INJECTION, SUSPENSION INTRAMUSCULAR at 11:22

## 2019-12-16 NOTE — PROGRESS NOTES
Pt is here for Depo Provera injection  Her last dose was 9/26/19  Depo given in L Deltoid  Tolerated well

## 2020-03-09 ENCOUNTER — ANNUAL EXAM (OUTPATIENT)
Dept: OBGYN CLINIC | Facility: CLINIC | Age: 30
End: 2020-03-09
Payer: COMMERCIAL

## 2020-03-09 VITALS
HEIGHT: 63 IN | DIASTOLIC BLOOD PRESSURE: 70 MMHG | WEIGHT: 150 LBS | BODY MASS INDEX: 26.58 KG/M2 | SYSTOLIC BLOOD PRESSURE: 106 MMHG

## 2020-03-09 DIAGNOSIS — Z30.013 ENCOUNTER FOR INITIAL PRESCRIPTION OF INJECTABLE CONTRACEPTIVE: ICD-10-CM

## 2020-03-09 DIAGNOSIS — Z01.419 ENCOUNTER FOR GYNECOLOGICAL EXAMINATION WITHOUT ABNORMAL FINDING: Primary | ICD-10-CM

## 2020-03-09 DIAGNOSIS — Z30.42 ENCOUNTER FOR DEPO-PROVERA CONTRACEPTION: ICD-10-CM

## 2020-03-09 PROCEDURE — G0145 SCR C/V CYTO,THINLAYER,RESCR: HCPCS | Performed by: OBSTETRICS & GYNECOLOGY

## 2020-03-09 PROCEDURE — 99395 PREV VISIT EST AGE 18-39: CPT | Performed by: OBSTETRICS & GYNECOLOGY

## 2020-03-09 RX ORDER — MEDROXYPROGESTERONE ACETATE 150 MG/ML
150 INJECTION, SUSPENSION INTRAMUSCULAR
Qty: 1 ML | Refills: 3 | Status: SHIPPED | OUTPATIENT
Start: 2020-03-09 | End: 2021-02-08

## 2020-03-09 RX ORDER — PEDI MULTIVIT NO.25/FOLIC ACID 300 MCG
2 TABLET,CHEWABLE ORAL DAILY
COMMUNITY

## 2020-03-09 RX ORDER — MEDROXYPROGESTERONE ACETATE 150 MG/ML
150 INJECTION, SUSPENSION INTRAMUSCULAR ONCE
Status: CANCELLED | OUTPATIENT
Start: 2020-03-09 | End: 2020-03-09

## 2020-03-09 RX ORDER — MEDROXYPROGESTERONE ACETATE 150 MG/ML
150 INJECTION, SUSPENSION INTRAMUSCULAR
Qty: 1 ML | Refills: 0 | Status: SHIPPED | OUTPATIENT
Start: 2020-03-09 | End: 2020-03-09 | Stop reason: SDUPTHER

## 2020-03-09 NOTE — PATIENT INSTRUCTIONS

## 2020-03-09 NOTE — PROGRESS NOTES
Assessment/Plan:    Encounter for gynecological examination without abnormal finding  Pap collected today  Continue Depoprovera    Encourage healthy diet, exercise, Calcium 1200mg per day and at least 800 iu Vitamin D daily  Diagnoses and all orders for this visit:    Encounter for gynecological examination without abnormal finding  -     Liquid-based pap, screening    Encounter for Depo-Provera contraception    Other orders  -     Cancel: medroxyPROGESTERone (DEPO-PROVERA) IM injection 150 mg  -     Pediatric Multiple Vit-C-FA (PEDIATRIC MULTIVITAMIN) chewable tablet; Chew 2 tablets daily          Subjective:      Patient ID: Fermín Sotelo is a 34 y o  female  Here today for her annual exam  Current complaints needs refill for her DEPo, made appt for Monday 3/16  [id number]  Menarche 11  LMP Nov 2019- no longer getting on DEPO happy with   Birth Control- DEPO Injection  Last PAP 2017-due today    Smoking Status a few a week  Drinking Status social   Recreational Drug Use no  Exercise Routine nothoing formal       Kids are well, 2 and 1  Happy on Depoprovera  Will continue  Gynecologic Exam   The patient's pertinent negatives include no genital itching, genital lesions, genital odor, genital rash, pelvic pain, vaginal bleeding or vaginal discharge  The patient is experiencing no pain  Pertinent negatives include no chills, constipation, diarrhea, fever, frequency, nausea, painful intercourse, sore throat, urgency or vomiting  She is sexually active  She uses progestin injections for contraception  The following portions of the patient's history were reviewed and updated as appropriate:   She  has a past medical history of Diabetes mellitus ([address] ), Oral infection, and Varicella  She   Patient Active Problem List    Diagnosis Date Noted    Encounter for gynecological examination without abnormal finding 03/09/2020     She  has a past surgical history that includes Vaginal delivery (2017)    Her family history includes Cancer (age of onset: 50) in her maternal aunt; Diabetes in her paternal grandmother; Nehemiah Sergey / Sil[place]re[place]outi in her mother; No Known Problems in her brother, father, paternal grandfather, and son  She  reports that she has been smoking  She has never used smokeless tobacco  She reports that she has current or past drug history  Drug: Marijuana  She reports that she does not drink alcohol  Current Outpatient Medications   Medication Sig Dispense Refill    medroxyPROGESTERone (DEPO-PROVERA) 150 mg/mL injection Inject 1 mL (150 mg total) into a muscle every 3 (three) months 1 mL 0    Pediatric Multiple Vit-C-FA (PEDIATRIC MULTIVITAMIN) chewable tablet Chew 2 tablets daily       No current facility-administered medications for this visit  Current Outpatient Medications on File Prior to Visit   Medication Sig    Pediatric Multiple Vit-C-FA (PEDIATRIC MULTIVITAMIN) chewable tablet Chew 2 tablets daily    [DISCONTINUED] Insulin Pen Needle (PEN NEEDLES) 32G X 4 MM MISC Use a new needle for each injection at bedtime (Patient not taking: Reported on 9/26/2019)    [DISCONTINUED] medroxyPROGESTERone (DEPO-PROVERA) 150 mg/mL injection Inject 1 mL (150 mg total) into a muscle every 3 (three) months    [DISCONTINUED] ONETOUCH DELICA LANCETS FINE MISC Test 4 times per day  (Patient not taking: Reported on 9/16/2019)    [DISCONTINUED] Prenatal Vit-Fe Fumarate-FA (PRENATAL VITAMIN PO) Take 1 tablet by mouth daily     No current facility-administered medications on file prior to visit  She has No Known Allergies       Review of Systems   Constitutional: Negative for activity change, appetite change, chills, fatigue and fever  HENT: Negative for rhinorrhea, sneezing and sore throat  Eyes: Negative for visual disturbance  Respiratory: Negative for cough, shortness of breath and wheezing  Cardiovascular: Negative for chest pain, palpitations and leg swelling     Gastrointestinal: Negative for abdominal distention, constipation, diarrhea, nausea and vomiting  Genitourinary: Negative for difficulty urinating, frequency, pelvic pain, urgency and vaginal discharge  Neurological: Negative for syncope and light-headedness  Objective:      /70 (BP Location: Right arm, Patient Position: Sitting, Cuff Size: Standard)   Ht 5' 3" (1 6 m)   Wt 68 kg (150 lb)   Breastfeeding Yes Comment: down to 3 times a day pumping   BMI 26 57 kg/m²          Physical Exam   Constitutional: She is oriented to person, place, and time  Pulmonary/Chest: Right breast exhibits no inverted nipple, no mass, no nipple discharge, no skin change and no tenderness  Left breast exhibits no inverted nipple, no mass, no nipple discharge, no skin change and no tenderness  No breast tenderness, discharge or bleeding  Breasts are symmetrical    Genitourinary: Vagina normal and uterus normal  No breast tenderness, discharge or bleeding  There is no rash, tenderness, lesion or injury on the right labia  There is no rash, tenderness, lesion or injury on the left labia  Uterus is not deviated, not enlarged, not fixed and not tender  Cervix exhibits no motion tenderness, no discharge and no friability  Right adnexum displays no mass, no tenderness and no fullness  Left adnexum displays no mass, no tenderness and no fullness  No tenderness or bleeding in the vagina  No vaginal discharge found  Neurological: She is alert and oriented to person, place, and time

## 2020-03-09 NOTE — ASSESSMENT & PLAN NOTE
Pap collected today  Continue Depoprovera    Encourage healthy diet, exercise, Calcium 1200mg per day and at least 800 iu Vitamin D daily

## 2020-03-15 LAB
LAB AP GYN PRIMARY INTERPRETATION: NORMAL
Lab: NORMAL

## 2020-03-16 ENCOUNTER — CLINICAL SUPPORT (OUTPATIENT)
Dept: OBGYN CLINIC | Facility: CLINIC | Age: 30
End: 2020-03-16
Payer: COMMERCIAL

## 2020-03-16 VITALS — SYSTOLIC BLOOD PRESSURE: 110 MMHG | BODY MASS INDEX: 27.53 KG/M2 | WEIGHT: 155.4 LBS | DIASTOLIC BLOOD PRESSURE: 70 MMHG

## 2020-03-16 DIAGNOSIS — Z30.42 ENCOUNTER FOR MANAGEMENT AND INJECTION OF DEPO-PROVERA: ICD-10-CM

## 2020-03-16 PROCEDURE — 96372 THER/PROPH/DIAG INJ SC/IM: CPT | Performed by: NURSE PRACTITIONER

## 2020-03-16 RX ORDER — MEDROXYPROGESTERONE ACETATE 150 MG/ML
150 INJECTION, SUSPENSION INTRAMUSCULAR ONCE
Status: COMPLETED | OUTPATIENT
Start: 2020-03-16 | End: 2020-03-16

## 2020-03-16 RX ADMIN — MEDROXYPROGESTERONE ACETATE 150 MG: 150 INJECTION, SUSPENSION INTRAMUSCULAR at 08:37

## 2020-03-16 NOTE — PROGRESS NOTES
Pt scheduled for depo, last injection given 12/16/19 in left deltoid, no complications noted  Yearly done 3/9/20 with KTM  Pt denies problems or concerns with depo, injection given IM in left deltoid-pt preference, tolerated well  Next appt scheduled prior to injection today

## 2020-03-17 ENCOUNTER — TELEPHONE (OUTPATIENT)
Dept: OBGYN CLINIC | Facility: CLINIC | Age: 30
End: 2020-03-17

## 2020-03-17 NOTE — TELEPHONE ENCOUNTER
Left message with results per Dr Banegas Parent, advised to call back with any questions   ----- Message from Bridger Wood MD sent at 3/16/2020 12:16 PM EDT -----  Please call Steph - pap negative

## 2020-06-08 ENCOUNTER — CLINICAL SUPPORT (OUTPATIENT)
Dept: OBGYN CLINIC | Facility: CLINIC | Age: 30
End: 2020-06-08
Payer: COMMERCIAL

## 2020-06-08 VITALS
DIASTOLIC BLOOD PRESSURE: 70 MMHG | BODY MASS INDEX: 27.46 KG/M2 | WEIGHT: 155 LBS | HEIGHT: 63 IN | SYSTOLIC BLOOD PRESSURE: 122 MMHG

## 2020-06-08 DIAGNOSIS — Z30.42 ENCOUNTER FOR DEPO-PROVERA CONTRACEPTION: Primary | ICD-10-CM

## 2020-06-08 PROCEDURE — 96372 THER/PROPH/DIAG INJ SC/IM: CPT | Performed by: NURSE PRACTITIONER

## 2020-06-08 RX ORDER — MEDROXYPROGESTERONE ACETATE 150 MG/ML
150 INJECTION, SUSPENSION INTRAMUSCULAR ONCE
Status: COMPLETED | OUTPATIENT
Start: 2020-06-08 | End: 2020-06-08

## 2020-06-08 RX ADMIN — MEDROXYPROGESTERONE ACETATE 150 MG: 150 INJECTION, SUSPENSION INTRAMUSCULAR at 14:29

## 2020-08-24 ENCOUNTER — CLINICAL SUPPORT (OUTPATIENT)
Dept: OBGYN CLINIC | Facility: CLINIC | Age: 30
End: 2020-08-24
Payer: COMMERCIAL

## 2020-08-24 VITALS
HEIGHT: 63 IN | WEIGHT: 154 LBS | DIASTOLIC BLOOD PRESSURE: 70 MMHG | SYSTOLIC BLOOD PRESSURE: 110 MMHG | BODY MASS INDEX: 27.29 KG/M2 | RESPIRATION RATE: 14 BRPM

## 2020-08-24 DIAGNOSIS — Z30.42 ENCOUNTER FOR DEPO-PROVERA CONTRACEPTION: Primary | ICD-10-CM

## 2020-08-24 PROCEDURE — 96372 THER/PROPH/DIAG INJ SC/IM: CPT | Performed by: OBSTETRICS & GYNECOLOGY

## 2020-08-24 RX ORDER — MEDROXYPROGESTERONE ACETATE 150 MG/ML
150 INJECTION, SUSPENSION INTRAMUSCULAR
Status: SHIPPED | OUTPATIENT
Start: 2020-08-24

## 2020-08-24 RX ADMIN — MEDROXYPROGESTERONE ACETATE 150 MG: 150 INJECTION, SUSPENSION INTRAMUSCULAR at 13:08

## 2020-08-24 NOTE — PROGRESS NOTES
Pt is here for Depo Provera injection  Her last dose was 06/08/2020  Her annual exam was on 03/09/2020  Urine pregnancy test done: NO    Depo given in Left  Deltoid   Tolerated well  YES  Lot [id number] Exp 08/2022  Next dose due 11/09/2020--11/23/2020    NDC# [id number]

## 2020-11-23 ENCOUNTER — CLINICAL SUPPORT (OUTPATIENT)
Dept: OBGYN CLINIC | Facility: CLINIC | Age: 30
End: 2020-11-23
Payer: COMMERCIAL

## 2020-11-23 VITALS
SYSTOLIC BLOOD PRESSURE: 110 MMHG | DIASTOLIC BLOOD PRESSURE: 76 MMHG | TEMPERATURE: 98.6 F | BODY MASS INDEX: 28.38 KG/M2 | WEIGHT: 160.2 LBS

## 2020-11-23 DIAGNOSIS — Z30.42 ENCOUNTER FOR DEPO-PROVERA CONTRACEPTION: Primary | ICD-10-CM

## 2020-11-23 PROCEDURE — 96372 THER/PROPH/DIAG INJ SC/IM: CPT | Performed by: NURSE PRACTITIONER

## 2020-11-23 RX ORDER — MEDROXYPROGESTERONE ACETATE 150 MG/ML
150 INJECTION, SUSPENSION INTRAMUSCULAR ONCE
Status: COMPLETED | OUTPATIENT
Start: 2020-11-23 | End: 2020-11-23

## 2020-11-23 RX ADMIN — MEDROXYPROGESTERONE ACETATE 150 MG: 150 INJECTION, SUSPENSION INTRAMUSCULAR at 11:26

## 2021-02-07 DIAGNOSIS — Z30.013 ENCOUNTER FOR INITIAL PRESCRIPTION OF INJECTABLE CONTRACEPTIVE: ICD-10-CM

## 2021-02-08 ENCOUNTER — TELEPHONE (OUTPATIENT)
Dept: OBGYN CLINIC | Facility: CLINIC | Age: 31
End: 2021-02-08

## 2021-02-08 RX ORDER — MEDROXYPROGESTERONE ACETATE 150 MG/ML
150 INJECTION, SUSPENSION INTRAMUSCULAR
Qty: 1 ML | Refills: 3 | Status: SHIPPED | OUTPATIENT
Start: 2021-02-08 | End: 2021-05-09

## 2021-02-08 NOTE — TELEPHONE ENCOUNTER
Patient was scheduled for an injection visit  Patient showed up and was advised we do not take her insurance  Patient left, I called patient and offered a N/C visit she stated she will just go somewhere that takes her insurance  She is going to most likely switch at time of open enrollment and possibly come back

## 2021-02-08 NOTE — TELEPHONE ENCOUNTER
Pt calling to check in for her depo appt, states her insurance changed to [facility]  I advised that we are non-participating with that insurance, but we could see her as self pay today  I explained the cost, but refused to pay and stated she would call back  Pt able to have injection for the next two weeks

## 2021-02-09 ENCOUNTER — TELEPHONE (OUTPATIENT)
Dept: OBGYN CLINIC | Facility: CLINIC | Age: 31
End: 2021-02-09

## 2021-02-09 NOTE — TELEPHONE ENCOUNTER
Nicole Wooten Ms  Luba Fontaine needs another refill for her depo sent to the [facility] in [facility] in [place], [place] she left it in her car and it is no good anymore  Please advise

## 2021-04-21 ENCOUNTER — IMMUNIZATIONS (OUTPATIENT)
Dept: FAMILY MEDICINE CLINIC | Facility: HOSPITAL | Age: 31
End: 2021-04-21

## 2021-04-21 DIAGNOSIS — Z23 ENCOUNTER FOR IMMUNIZATION: Primary | ICD-10-CM

## 2021-04-21 PROCEDURE — 91300 SARS-COV-2 / COVID-19 MRNA VACCINE (PFIZER-BIONTECH) 30 MCG: CPT

## 2021-04-21 PROCEDURE — 0001A SARS-COV-2 / COVID-19 MRNA VACCINE (PFIZER-BIONTECH) 30 MCG: CPT

## 2021-05-12 ENCOUNTER — IMMUNIZATIONS (OUTPATIENT)
Dept: FAMILY MEDICINE CLINIC | Facility: HOSPITAL | Age: 31
End: 2021-05-12

## 2021-05-12 DIAGNOSIS — Z23 ENCOUNTER FOR IMMUNIZATION: Primary | ICD-10-CM

## 2021-05-12 PROCEDURE — 91300 SARS-COV-2 / COVID-19 MRNA VACCINE (PFIZER-BIONTECH) 30 MCG: CPT

## 2021-05-12 PROCEDURE — 0002A SARS-COV-2 / COVID-19 MRNA VACCINE (PFIZER-BIONTECH) 30 MCG: CPT

## 2023-01-03 NOTE — ANESTHESIA PROCEDURE NOTES
CSE Block    Patient location during procedure: OB  Start time: 8/16/2019 11:45 AM  Reason for block: at surgeon's request  Staffing  Anesthesiologist: Marianela Galindo MD  Performed: anesthesiologist   Preanesthetic Checklist  Completed: patient identified, surgical consent, pre-op evaluation, timeout performed, IV checked, risks and benefits discussed and monitors and equipment checked  CSE  Patient position: sitting  Prep: ChloraPrep  Patient monitoring: heart rate, continuous pulse ox, frequent blood pressure checks and cardiac monitor  Approach: midline  Spinal Needle  Needle type: pencil-tip   Needle gauge: 27 G  Epidural Needle  Injection technique: XIN air  Needle type: Tuohy   Needle gauge: 18 G  Location: lumbar (1-5)  Catheter  Epidural catheter type: Closed tip  Catheter size: 20 G  Catheter at skin depth: 11 5 cm  Test dose: lidocaine 1 5% with epinephrine 1-to-200,000  Assessment  Injection Assessment:  negative aspiration for heme, no pain on injection and no paresthesia on injection  Yes